# Patient Record
Sex: MALE | Race: WHITE | Employment: OTHER | URBAN - METROPOLITAN AREA
[De-identification: names, ages, dates, MRNs, and addresses within clinical notes are randomized per-mention and may not be internally consistent; named-entity substitution may affect disease eponyms.]

---

## 2017-05-27 ENCOUNTER — APPOINTMENT (OUTPATIENT)
Dept: CT IMAGING | Facility: HOSPITAL | Age: 52
End: 2017-05-27
Attending: EMERGENCY MEDICINE
Payer: COMMERCIAL

## 2017-05-27 ENCOUNTER — HOSPITAL ENCOUNTER (EMERGENCY)
Facility: HOSPITAL | Age: 52
Discharge: HOME | End: 2017-05-27
Attending: EMERGENCY MEDICINE
Payer: COMMERCIAL

## 2017-05-27 VITALS
RESPIRATION RATE: 18 BRPM | SYSTOLIC BLOOD PRESSURE: 142 MMHG | WEIGHT: 250 LBS | HEART RATE: 74 BPM | TEMPERATURE: 98 F | DIASTOLIC BLOOD PRESSURE: 75 MMHG | HEIGHT: 69 IN | BODY MASS INDEX: 37.03 KG/M2 | OXYGEN SATURATION: 95 %

## 2017-05-27 DIAGNOSIS — R10.9 ABDOMINAL PAIN OF UNKNOWN ETIOLOGY: Primary | ICD-10-CM

## 2017-05-27 PROCEDURE — 74177 CT ABD & PELVIS W/CONTRAST: CPT | Performed by: EMERGENCY MEDICINE

## 2017-05-27 PROCEDURE — 99284 EMERGENCY DEPT VISIT MOD MDM: CPT

## 2017-05-27 PROCEDURE — 80048 BASIC METABOLIC PNL TOTAL CA: CPT

## 2017-05-27 PROCEDURE — 96372 THER/PROPH/DIAG INJ SC/IM: CPT

## 2017-05-27 PROCEDURE — 85025 COMPLETE CBC W/AUTO DIFF WBC: CPT

## 2017-05-27 PROCEDURE — 83690 ASSAY OF LIPASE: CPT | Performed by: EMERGENCY MEDICINE

## 2017-05-27 PROCEDURE — 81003 URINALYSIS AUTO W/O SCOPE: CPT

## 2017-05-27 PROCEDURE — 96374 THER/PROPH/DIAG INJ IV PUSH: CPT

## 2017-05-27 PROCEDURE — 80076 HEPATIC FUNCTION PANEL: CPT | Performed by: EMERGENCY MEDICINE

## 2017-05-27 PROCEDURE — 96361 HYDRATE IV INFUSION ADD-ON: CPT

## 2017-05-27 RX ORDER — DICYCLOMINE HCL 20 MG
20 TABLET ORAL 4 TIMES DAILY PRN
Qty: 30 TABLET | Refills: 0 | Status: SHIPPED | OUTPATIENT
Start: 2017-05-27 | End: 2017-06-26

## 2017-05-27 RX ORDER — DICYCLOMINE HYDROCHLORIDE 10 MG/ML
20 INJECTION INTRAMUSCULAR ONCE
Status: COMPLETED | OUTPATIENT
Start: 2017-05-27 | End: 2017-05-27

## 2017-05-27 RX ORDER — MORPHINE SULFATE 4 MG/ML
4 INJECTION, SOLUTION INTRAMUSCULAR; INTRAVENOUS ONCE
Status: COMPLETED | OUTPATIENT
Start: 2017-05-27 | End: 2017-05-27

## 2017-05-27 NOTE — ED PROVIDER NOTES
Patient Seen in: [facility] AND [facility] Emergency Department    History   Patient presents with:  Abdomen/Flank Pain (GI/)    Stated Complaint: abdominal pain    HPI    Pt is 45 yo M who p/w 6 hours of progressively worsening constant abdominal pain.  Pt sta no cyanosis/clubbing/edema  Neuro: CN intact, normal speech, normal gait, 5/5 motor strength in all extremities, no focal deficits  SKIN: warm, dry, no rashes        ED Course     Labs Reviewed   BASIC METABOLIC PANEL (8) - Abnormal; Notable for the follow [id number]  [address] [facility]  [phone]            Medications Prescribed:  Current Discharge Medication List    START taking these medications    Dicyclomine HCl 20 MG Oral Tab  Take 1 tabl

## 2021-10-30 ENCOUNTER — HOSPITAL ENCOUNTER (INPATIENT)
Facility: HOSPITAL | Age: 56
LOS: 4 days | Discharge: HOME | DRG: 331 | End: 2021-11-04
Attending: EMERGENCY MEDICINE | Admitting: HOSPITALIST
Payer: COMMERCIAL

## 2021-10-30 ENCOUNTER — ANESTHESIA (OUTPATIENT)
Dept: SURGERY | Facility: HOSPITAL | Age: 56
DRG: 331 | End: 2021-10-30
Payer: COMMERCIAL

## 2021-10-30 ENCOUNTER — ANESTHESIA EVENT (OUTPATIENT)
Dept: SURGERY | Facility: HOSPITAL | Age: 56
DRG: 331 | End: 2021-10-30
Payer: COMMERCIAL

## 2021-10-30 ENCOUNTER — APPOINTMENT (OUTPATIENT)
Dept: CT IMAGING | Facility: HOSPITAL | Age: 56
DRG: 331 | End: 2021-10-30
Attending: EMERGENCY MEDICINE
Payer: COMMERCIAL

## 2021-10-30 DIAGNOSIS — K35.32 PERFORATED APPENDIX: ICD-10-CM

## 2021-10-30 DIAGNOSIS — K35.33 ACUTE APPENDICITIS WITH PERFORATION, LOCALIZED PERITONITIS, AND ABSCESS, WITHOUT GANGRENE: Primary | ICD-10-CM

## 2021-10-30 PROCEDURE — 0DTH4ZZ RESECTION OF CECUM, PERCUTANEOUS ENDOSCOPIC APPROACH: ICD-10-PCS | Performed by: SURGERY

## 2021-10-30 PROCEDURE — 99222 1ST HOSP IP/OBS MODERATE 55: CPT | Performed by: HOSPITALIST

## 2021-10-30 PROCEDURE — 74177 CT ABD & PELVIS W/CONTRAST: CPT | Performed by: EMERGENCY MEDICINE

## 2021-10-30 PROCEDURE — 0DTJ4ZZ RESECTION OF APPENDIX, PERCUTANEOUS ENDOSCOPIC APPROACH: ICD-10-PCS | Performed by: SURGERY

## 2021-10-30 PROCEDURE — 0W9H4ZZ DRAINAGE OF RETROPERITONEUM, PERCUTANEOUS ENDOSCOPIC APPROACH: ICD-10-PCS | Performed by: SURGERY

## 2021-10-30 RX ORDER — MORPHINE SULFATE 4 MG/ML
4 INJECTION, SOLUTION INTRAMUSCULAR; INTRAVENOUS EVERY 10 MIN PRN
Status: DISCONTINUED | OUTPATIENT
Start: 2021-10-30 | End: 2021-10-31 | Stop reason: HOSPADM

## 2021-10-30 RX ORDER — GLYCOPYRROLATE 0.2 MG/ML
INJECTION, SOLUTION INTRAMUSCULAR; INTRAVENOUS AS NEEDED
Status: DISCONTINUED | OUTPATIENT
Start: 2021-10-30 | End: 2021-10-30 | Stop reason: SURG

## 2021-10-30 RX ORDER — NEOSTIGMINE METHYLSULFATE 1 MG/ML
INJECTION INTRAVENOUS AS NEEDED
Status: DISCONTINUED | OUTPATIENT
Start: 2021-10-30 | End: 2021-10-30 | Stop reason: SURG

## 2021-10-30 RX ORDER — DEXAMETHASONE SODIUM PHOSPHATE 4 MG/ML
VIAL (ML) INJECTION AS NEEDED
Status: DISCONTINUED | OUTPATIENT
Start: 2021-10-30 | End: 2021-10-30 | Stop reason: SURG

## 2021-10-30 RX ORDER — HYDROMORPHONE HYDROCHLORIDE 1 MG/ML
0.2 INJECTION, SOLUTION INTRAMUSCULAR; INTRAVENOUS; SUBCUTANEOUS EVERY 5 MIN PRN
Status: DISCONTINUED | OUTPATIENT
Start: 2021-10-30 | End: 2021-10-31 | Stop reason: HOSPADM

## 2021-10-30 RX ORDER — BUPIVACAINE HYDROCHLORIDE AND EPINEPHRINE 2.5; 5 MG/ML; UG/ML
INJECTION, SOLUTION INFILTRATION; PERINEURAL AS NEEDED
Status: DISCONTINUED | OUTPATIENT
Start: 2021-10-30 | End: 2021-10-30 | Stop reason: HOSPADM

## 2021-10-30 RX ORDER — LIDOCAINE HYDROCHLORIDE 10 MG/ML
INJECTION, SOLUTION EPIDURAL; INFILTRATION; INTRACAUDAL; PERINEURAL AS NEEDED
Status: DISCONTINUED | OUTPATIENT
Start: 2021-10-30 | End: 2021-10-30 | Stop reason: SURG

## 2021-10-30 RX ORDER — ROCURONIUM BROMIDE 10 MG/ML
INJECTION, SOLUTION INTRAVENOUS AS NEEDED
Status: DISCONTINUED | OUTPATIENT
Start: 2021-10-30 | End: 2021-10-30 | Stop reason: SURG

## 2021-10-30 RX ORDER — ONDANSETRON 2 MG/ML
4 INJECTION INTRAMUSCULAR; INTRAVENOUS ONCE AS NEEDED
Status: COMPLETED | OUTPATIENT
Start: 2021-10-30 | End: 2021-10-30

## 2021-10-30 RX ORDER — SODIUM CHLORIDE, SODIUM LACTATE, POTASSIUM CHLORIDE, CALCIUM CHLORIDE 600; 310; 30; 20 MG/100ML; MG/100ML; MG/100ML; MG/100ML
INJECTION, SOLUTION INTRAVENOUS CONTINUOUS
Status: DISCONTINUED | OUTPATIENT
Start: 2021-10-30 | End: 2021-10-31 | Stop reason: HOSPADM

## 2021-10-30 RX ORDER — PROCHLORPERAZINE EDISYLATE 5 MG/ML
5 INJECTION INTRAMUSCULAR; INTRAVENOUS ONCE AS NEEDED
Status: ACTIVE | OUTPATIENT
Start: 2021-10-30 | End: 2021-10-30

## 2021-10-30 RX ORDER — MORPHINE SULFATE 10 MG/ML
6 INJECTION, SOLUTION INTRAMUSCULAR; INTRAVENOUS EVERY 10 MIN PRN
Status: DISCONTINUED | OUTPATIENT
Start: 2021-10-30 | End: 2021-10-31 | Stop reason: HOSPADM

## 2021-10-30 RX ORDER — MIDAZOLAM HYDROCHLORIDE 1 MG/ML
INJECTION INTRAMUSCULAR; INTRAVENOUS AS NEEDED
Status: DISCONTINUED | OUTPATIENT
Start: 2021-10-30 | End: 2021-10-30 | Stop reason: SURG

## 2021-10-30 RX ORDER — ONDANSETRON 2 MG/ML
INJECTION INTRAMUSCULAR; INTRAVENOUS AS NEEDED
Status: DISCONTINUED | OUTPATIENT
Start: 2021-10-30 | End: 2021-10-30 | Stop reason: SURG

## 2021-10-30 RX ORDER — SODIUM CHLORIDE 9 MG/ML
INJECTION, SOLUTION INTRAVENOUS CONTINUOUS PRN
Status: DISCONTINUED | OUTPATIENT
Start: 2021-10-30 | End: 2021-10-30 | Stop reason: SURG

## 2021-10-30 RX ORDER — HYDROCODONE BITARTRATE AND ACETAMINOPHEN 5; 325 MG/1; MG/1
2 TABLET ORAL AS NEEDED
Status: DISCONTINUED | OUTPATIENT
Start: 2021-10-30 | End: 2021-10-31 | Stop reason: HOSPADM

## 2021-10-30 RX ORDER — HYDROMORPHONE HYDROCHLORIDE 1 MG/ML
0.4 INJECTION, SOLUTION INTRAMUSCULAR; INTRAVENOUS; SUBCUTANEOUS EVERY 5 MIN PRN
Status: DISCONTINUED | OUTPATIENT
Start: 2021-10-30 | End: 2021-10-31 | Stop reason: HOSPADM

## 2021-10-30 RX ORDER — NALOXONE HYDROCHLORIDE 0.4 MG/ML
80 INJECTION, SOLUTION INTRAMUSCULAR; INTRAVENOUS; SUBCUTANEOUS AS NEEDED
Status: DISCONTINUED | OUTPATIENT
Start: 2021-10-30 | End: 2021-10-31 | Stop reason: HOSPADM

## 2021-10-30 RX ORDER — HYDROCODONE BITARTRATE AND ACETAMINOPHEN 5; 325 MG/1; MG/1
1 TABLET ORAL AS NEEDED
Status: DISCONTINUED | OUTPATIENT
Start: 2021-10-30 | End: 2021-10-31 | Stop reason: HOSPADM

## 2021-10-30 RX ORDER — MORPHINE SULFATE 2 MG/ML
2 INJECTION, SOLUTION INTRAMUSCULAR; INTRAVENOUS EVERY 10 MIN PRN
Status: DISCONTINUED | OUTPATIENT
Start: 2021-10-30 | End: 2021-10-31 | Stop reason: HOSPADM

## 2021-10-30 RX ORDER — HALOPERIDOL 5 MG/ML
0.25 INJECTION INTRAMUSCULAR ONCE AS NEEDED
Status: ACTIVE | OUTPATIENT
Start: 2021-10-30 | End: 2021-10-30

## 2021-10-30 RX ORDER — HYDROMORPHONE HYDROCHLORIDE 1 MG/ML
0.6 INJECTION, SOLUTION INTRAMUSCULAR; INTRAVENOUS; SUBCUTANEOUS EVERY 5 MIN PRN
Status: DISCONTINUED | OUTPATIENT
Start: 2021-10-30 | End: 2021-10-31 | Stop reason: HOSPADM

## 2021-10-30 RX ADMIN — SODIUM CHLORIDE: 9 INJECTION, SOLUTION INTRAVENOUS at 22:31:00

## 2021-10-30 RX ADMIN — MIDAZOLAM HYDROCHLORIDE 2 MG: 1 INJECTION INTRAMUSCULAR; INTRAVENOUS at 20:07:00

## 2021-10-30 RX ADMIN — LIDOCAINE HYDROCHLORIDE 50 MG: 10 INJECTION, SOLUTION EPIDURAL; INFILTRATION; INTRACAUDAL; PERINEURAL at 20:07:00

## 2021-10-30 RX ADMIN — GLYCOPYRROLATE 0.4 MG: 0.2 INJECTION, SOLUTION INTRAMUSCULAR; INTRAVENOUS at 22:23:00

## 2021-10-30 RX ADMIN — DEXAMETHASONE SODIUM PHOSPHATE 4 MG: 4 MG/ML VIAL (ML) INJECTION at 22:02:00

## 2021-10-30 RX ADMIN — ROCURONIUM BROMIDE 30 MG: 10 INJECTION, SOLUTION INTRAVENOUS at 20:16:00

## 2021-10-30 RX ADMIN — ONDANSETRON 4 MG: 2 INJECTION INTRAMUSCULAR; INTRAVENOUS at 22:12:00

## 2021-10-30 RX ADMIN — NEOSTIGMINE METHYLSULFATE 3 MG: 1 INJECTION INTRAVENOUS at 22:23:00

## 2021-10-30 RX ADMIN — SODIUM CHLORIDE: 9 INJECTION, SOLUTION INTRAVENOUS at 20:03:00

## 2021-10-30 NOTE — PROGRESS NOTES
[facility] Pharmacy Note: Antimicrobial Weight Based Dose Adjustment for: piperacillin/tazobactam (Nell Lent)    Usha Cheney is a 60year old patient who has been prescribed piperacillin/tazobactam (ZOSYN) 3.375 gm x 1 dose.     Estimated Creatinine Clearance: 75.8 mL/

## 2021-10-30 NOTE — ANESTHESIA PREPROCEDURE EVALUATION
Anesthesia PreOp Note    HPI:     Kareem Luna is a 60year old male who presents for preoperative consultation requested by: Janice Savage MD    Date of Surgery: 10/30/2021    Procedure(s):  LAPAROSCOPIC APPENDECTOMY, POSSIBLE OPEN  Indication: Perfor Exercise per Session: Not on file  Stress:       Feeling of Stress : Not on file  Social Connections:       Frequency of Communication with Friends and Family: Not on file      Frequency of Social Gatherings with Friends and Family: Not on file      Attend Neuro/Psych      GI/Hepatic/Renal      Endo/Other    Abdominal   (+) obese,                Anesthesia Plan:   ASA:  2  Emergent    Plan:   General  Airway:  ETT  Informed Consent Plan and Risks Discussed With:  Patient      I have informed Tracee triplett

## 2021-10-30 NOTE — ED INITIAL ASSESSMENT (HPI)
R flank pain onset Tuesday -thur and returned last night. +loss appetite, belching, nausea and diarrhea

## 2021-10-31 PROCEDURE — 99233 SBSQ HOSP IP/OBS HIGH 50: CPT | Performed by: HOSPITALIST

## 2021-10-31 RX ORDER — ACETAMINOPHEN 10 MG/ML
1000 INJECTION, SOLUTION INTRAVENOUS EVERY 6 HOURS
Status: DISCONTINUED | OUTPATIENT
Start: 2021-10-31 | End: 2021-11-04

## 2021-10-31 RX ORDER — ONDANSETRON 2 MG/ML
4 INJECTION INTRAMUSCULAR; INTRAVENOUS EVERY 6 HOURS PRN
Status: DISCONTINUED | OUTPATIENT
Start: 2021-10-31 | End: 2021-11-04

## 2021-10-31 RX ORDER — HEPARIN SODIUM 5000 [USP'U]/ML
5000 INJECTION, SOLUTION INTRAVENOUS; SUBCUTANEOUS EVERY 12 HOURS
Status: DISCONTINUED | OUTPATIENT
Start: 2021-10-31 | End: 2021-11-04

## 2021-10-31 RX ORDER — DEXTROSE, SODIUM CHLORIDE, AND POTASSIUM CHLORIDE 5; .45; .15 G/100ML; G/100ML; G/100ML
INJECTION INTRAVENOUS CONTINUOUS
Status: DISCONTINUED | OUTPATIENT
Start: 2021-10-31 | End: 2021-11-04

## 2021-10-31 RX ORDER — METOCLOPRAMIDE HYDROCHLORIDE 5 MG/ML
10 INJECTION INTRAMUSCULAR; INTRAVENOUS EVERY 6 HOURS PRN
Status: DISCONTINUED | OUTPATIENT
Start: 2021-10-31 | End: 2021-11-04

## 2021-10-31 RX ORDER — FAMOTIDINE 10 MG/ML
20 INJECTION, SOLUTION INTRAVENOUS 2 TIMES DAILY
Status: DISCONTINUED | OUTPATIENT
Start: 2021-10-31 | End: 2021-11-04

## 2021-10-31 NOTE — PROGRESS NOTES
[facility]    Progress Note    Rudy Parra Patient Status:  Inpatient    1965 MRN [id number]   Location [facility] 4W/SW/SE Attending Shana Ruiz MD   Hosp Day # 0 PCP Gerda Garcia MD       Subjective:     Feels ok  10/31/2021    CO2 25.0 10/31/2021     (H) 10/31/2021    CA 7.8 (L) 10/31/2021    ALB 2.6 (L) 10/31/2021    ALKPHO 79 10/31/2021    BILT 0.6 10/31/2021    TP 7.1 10/31/2021    AST 14 (L) 10/31/2021    ALT 41 10/31/2021    LIP 90 10/30/2021

## 2021-10-31 NOTE — BRIEF OP NOTE
Pre-Operative Diagnosis: Perforated appendix WITH ABSCESS   50 appendix WITH ABSCESS      Procedure Performed:   LAPAROSCOPIC APPENDECTOMY WITH CECECTOMY, DRAINAGE OF ABSCESS    Surgeon(s) and Role:     Lashanda Tapia MD - Primary    Assistant(s):

## 2021-10-31 NOTE — CONSULTS
[facility]    Report of Consultation    Celia Birmingham Patient Status:  Emergency    1965 MRN [id number]   Location [facility] PRE OP RECOVERY Attending Joanna Rodriguez MD   Hosp Day # 0 PCP Sean Upton MD     Date of A PRN  •  HYDROmorphone HCl (DILAUDID) 1 MG/ML injection 0.2 mg, 0.2 mg, Intravenous, Q5 Min PRN  •  HYDROmorphone HCl (DILAUDID) 1 MG/ML injection 0.4 mg, 0.4 mg, Intravenous, Q5 Min PRN  •  HYDROmorphone HCl (DILAUDID) 1 MG/ML injection 0.6 mg, 0.6 mg, Int 10/30/2021    CREATSERUM 1.11 10/30/2021    BUN 13 10/30/2021     10/30/2021    K 3.8 10/30/2021     10/30/2021    CO2 27.0 10/30/2021    GLU 91 10/30/2021    CA 9.1 10/30/2021    ALB 3.1 (L) 10/30/2021    ALKPHO 87 10/30/2021    BILT 0.5 10/30 infarction, respiratory failure, renal failure, pulmonary and was, DVT, and even death were all discussed in detail with the patient and his wife who  understands and consents and wishes to proceed with the operation.  We will plan laparoscopic appendectomy

## 2021-10-31 NOTE — ANESTHESIA POSTPROCEDURE EVALUATION
Patient: Lisette Flores    Procedure Summary     Date: 10/30/21 Room / Location: [address] [place] [address] MAIN OR 04 / [address] [place] [address] MAIN OR    Anesthesia Start: 2003 Anesthesia Stop:     Procedure: LAPAROSCOPIC APPENDECTOMY WITH CECECTOMY, DRAINAGE OF ABSCESS (N/A Abdomen) Diagnosis:

## 2021-10-31 NOTE — PROGRESS NOTES
ADMISSION NOTE    60year old male with no significanat PMH presented with RLQ abdominal pain found to have perforated appendix with phlegmon. Went from ED to OR for appendectomy. Available medical records partially reviewed. Dictation to follow.     Ebony Alba

## 2021-10-31 NOTE — ANESTHESIA PROCEDURE NOTES
Airway  Date/Time: 10/30/2021 8:09 PM  Urgency: Elective      General Information and Staff    Patient location during procedure: OR  Anesthesiologist: Magy Hampton MD  Performed: anesthesiologist     Indications and Patient Condition  Indications f

## 2021-10-31 NOTE — OPERATIVE REPORT
L[facility] 43 OPERATIVE REPORT:     PATIENT NAME: Michelle Cowden  : 1965   MRN: [id number]  SITE:  [facility]     DATE OF OPERATION:   10/30/2021     PREOPERATIVE DIAGNOSIS:  Acute appendicitis with abscess         POSTOPERAT and even death were all discussed in detail with the patient and his wife who  understands and consents and wishes to proceed with the operation.  We will plan laparoscopic appendectomy possible open appendectomy, at [facility] emergently this evenin inflammatory process in the cecum. I am uncertain if this is a perforated cecal cancer or just perforated appendicitis. Given the above I felt it was appropriate to perform a cecectomy.   Using a powered Chicora 60 stapler the cecum mesoappendix and the a extubated in stable condition. All counts were correct at the end of the case. The role of my assistant was critical to the operation. They acted in manipulation of the camera, retraction of the appendix as well as closure of the abdominal wounds.

## 2021-10-31 NOTE — PROGRESS NOTES
PHYSICAL THERAPY EVALUATION - INPATIENT     Room Number: 420/420-A  Evaluation Date: 10/31/2021  Type of Evaluation: Initial   Physician Order: PT Eval and Treat    Presenting Problem: Pt admitted w/ acute perforated appendicitis with abscess s/p lap appe for continued PT at home after discharge. Anticipate Pt to return home w/ initial intermittent assistance when medically appropriate. Will cont to follow during IP stay to increase activity and independence, goals established.    DISCHARGE RECOMMENDATIO (\" Minimum\")  Location: incisional  Management Techniques: Activity promotion; Body mechanics;Breathing techniques;Relaxation;Repositioning (bracing incisions for effective cough)    COGNITION  · AXO x4    RANGE OF MOTION AND STRENGTH ASSESSMENT  BLE rachana training  Posture  Strengthening  Lower therapeutic exercise:   Ankle pumps  Heel raises  Heel slides  LAQ    Patient End of Session: Up in chair;Needs met;Call light within reach;RN aware of session/findings    CURRENT GOALS    Goals to be met by: 47.27.99

## 2021-10-31 NOTE — ED PROVIDER NOTES
Patient Seen in: [id number] [facility] [address]      History   Patient presents with:  Abdomen/Flank Pain    Stated Complaint: abd pain     Subjective:   HPI    64-year-old male with no significant past medical history here with complaints o 10/30/21 2232 Nasal cannula       Current:/56   Pulse 63   Temp 97.6 °F (36.4 °C) (Temporal)   Resp 21   Ht 177.8 cm (5' 10\")   Wt 115.7 kg   SpO2 98%   BMI 36.59 kg/m²         Physical Exam  Vitals and nursing note reviewed.    HENT:      Head: Norm 73 >=60    GFR, -American 85 >=60    ALT 48 16 - 61 U/L    AST 15 15 - 37 U/L    Alkaline Phosphatase 87 45 - 117 U/L    Bilirubin, Total 0.5 0.1 - 2.0 mg/dL    Total Protein 7.8 6.4 - 8.2 g/dL    Albumin 3.1 (L) 3.4 - 5.0 g/dL    Globulin  4.7 (H) 7:05 PM    Specimen: Nares;  Other   Result Value Ref Range    Rapid SARS-CoV-2 by PCR Not Detected Not Detected       Imaging Results Available and Reviewed by me while in ED:  CT ABDOMEN PELVIS IV CONTRAST, NO ORAL (ER)    Result Date: 10/30/2021  CONCLUS on the presenting problem including appendicitis, UTI, diverticulitis, nephrolithiasis.     Critical Care Time:  Total critical care time for this patient was 30 minutes exclusive of separately billable procedures, but in obtaining history, performing a phy

## 2021-10-31 NOTE — H&P
[facility]    PATIENT'S NAME: Carleen Parker   ATTENDING PHYSICIAN: Domi Chamberlain MD   PATIENT ACCOUNT#:   [de-identified]    LOCATION:  [address] [place] [address] #:   [id number]       YOB: 1965  ADMISSION DATE:       10/30/20 syndrome, the etiology of which is unclear. He otherwise has had no other surgeries, and denies any chronic medical problems such as diabetes, high blood pressure, asthma, or heart problems.   He does snore at night and has a body habitus suggestive of sle His mood and affect were pleasant and cooperative. BACK:  There was no costovertebral angle tenderness noted. LABORATORY DATA:  Previously mentioned. ASSESSMENT AND PLAN:    1.    Acute perforated appendicitis status post laparoscopic appendectomy wi

## 2021-10-31 NOTE — PLAN OF CARE
Pt is ao x 4, pt is sleeping on 3L NC, pt using morphine PCA for pain, pt has two ANNA drains, call light next to pt, pt's wife stayed the night, pt has NG tube set to L. I.S., pt is voiding via mcallister which stays in til Monday    Problem: PAIN - ADULT  Goal: resources  Description: INTERVENTIONS:  - Identify barriers to discharge w/pt and caregiver  - Include patient/family/discharge partner in discharge planning  - Arrange for needed discharge resources and transportation as appropriate  - Identify discharge

## 2021-11-01 PROCEDURE — 99233 SBSQ HOSP IP/OBS HIGH 50: CPT | Performed by: HOSPITALIST

## 2021-11-01 NOTE — PAYOR COMM NOTE
--------------  ADMISSION REVIEW     Payor: MILLY LINDA  Subscriber #:  [id number]  Authorization Number: [id number]    Admit date: 10/31/21  Admit time: 12:03 AM     10/30 Patient Seen in: [facility] ED    History   Stated Complaint: abd pain     57 39.8    RDW 11.7    .0    Neutrophil Absolute Prelim 13.21 (H)    Neutrophil Absolute 13.21 (H)    Lymphocyte Absolute 1.46    Monocyte Absolute 1.20 (H)    Eosinophil Absolute 0.23    Basophil Absolute 0.05    Immature Granulocyte Absolute 0.15 pain.    HISTORY OF PRESENT ILLNESS:  This is a very pleasant 43-year-old gentleman who first developed some abdominal discomfort about a week ago. Pain has been primarily confined to the right lower quadrant.   The patient thought it was getting better a pressure 125/66, O2 saturation 97% on 3L nasal cannula. HEENT:  Normocephalic, atraumatic. Pupils equal, reactive. Sclerae anicteric. There was no sinus tenderness. Mucous membranes were moist.  Oropharynx was crowded. NECK:  Supple.   LUNGS:  Decreas temperature 97.9 °F (36.6 °C), temperature source Oral, resp. rate 16, height 5' 10\" (1.778 m), weight 255 lb (115.7 kg), SpO2 96 %.     GEN: NAD, up in chair, NGT+  HEENT: conjunctivae/corneas clear. PERRL, EOM's intact.   Neck: no adenopathy, no carotid output:1250; Drains:185; Blood:50]      General appearance: alert, appears stated age and cooperative  Neck: no JVD and supple, symmetrical, trachea midline  Pulmonary: No labored breathing, equal respiratory excursion  Cardiovascular: regular rate and rhy NS 30 mL PCA syringe     Date Action Dose Route User    11/1/2021 0747 New Bag 30 mg Intravenous [place] Howard RN    10/31/2021 1543 New Bag 30 mg Intravenous Kimberly Butcher RN      Piperacillin Sod-Tazobactam So (ZOSYN) 4.5 g in dextrose 5% 100 mL IVP

## 2021-11-01 NOTE — PLAN OF CARE
Patient alert and oriented x4. Alvarez in place. Pain controlled with PCA pump. Patient on room air. NG tube in place on intermittent suction. Patient ambulating with standby assist. Vital signs stable. CMS intact.  Three abdominal dressings clean, dry and in relaxation techniques  - Monitor for opioid side effects  - Notify MD/LIP if interventions unsuccessful or patient reports new pain  - Anticipate increased pain with activity and pre-medicate as appropriate  Outcome: Progressing     Problem: RISK FOR INFEC cognitive ability or social support system  Outcome: Progressing

## 2021-11-01 NOTE — PROGRESS NOTES
[facility]    Progress Note  Late entry    Keiry Annamaria Patient Status:  Inpatient    1965 MRN [id number]   Location [facility] 4W/SW/SE Attending Tara Grey MD   Hosp Day # 1 PCP Wally Gonzales MD       Subjective: 8.3*   ALB 3.1* 2.6*  --     136 138   K 3.8 4.5 4.0    105 106   CO2 27.0 25.0 26.0   ALKPHO 87 79  --    AST 15 14*  --    ALT 48 41  --    BILT 0.5 0.6  --    TP 7.8 7.1  --              CT ABDOMEN PELVIS IV CONTRAST, NO ORAL (ER)    Result

## 2021-11-01 NOTE — PROGRESS NOTES
[facility]    Progress Note    Lisette Flores Patient Status:  Inpatient    1965 MRN [id number]   Location [facility] 4W/SW/SE Attending Justice Rubinstein, MD   Hosp Day # 1 PCP Su Figueroa MD       Subjective:     Feels ok 11/01/2021     11/01/2021    CO2 26.0 11/01/2021     (H) 11/01/2021    CA 8.3 (L) 11/01/2021    ALB 2.6 (L) 10/31/2021    ALKPHO 79 10/31/2021    BILT 0.6 10/31/2021    TP 7.1 10/31/2021    AST 14 (L) 10/31/2021    ALT 41 10/31/2021    LIP 90

## 2021-11-01 NOTE — PLAN OF CARE
Pt is ao x 4, pt is sleeping on room air, pt using morphine PCA for pain, pt has two ANNA drains, call light next to pt, pt's wife stayed the night, pt has NG tube set to L. I.S., pt is voiding via mcallister which stays in til Monday     Problem: PAIN - ADULT  Go appropriate resources  Description: INTERVENTIONS:  - Identify barriers to discharge w/pt and caregiver  - Include patient/family/discharge partner in discharge planning  - Arrange for needed discharge resources and transportation as appropriate  - Identif

## 2021-11-01 NOTE — PROGRESS NOTES
[facility]    Progress Note  Late entry    Chandrika Bagley Patient Status:  Inpatient    1965 MRN [id number]   Location [facility] 4W/SW/SE Attending Casey Quispe MD   Hosp Day # 1 PCP Dio Ram MD       Subjective: CREATSERUM 1.11 1.12 0.97   GFRAA 85 84 100   GFRNAA 73 73 86   CA 9.1 7.8* 8.3*   ALB 3.1* 2.6*  --     136 138   K 3.8 4.5 4.0    105 106   CO2 27.0 25.0 26.0   ALKPHO 87 79  --    AST 15 14*  --    ALT 48 41  --    BILT 0.5 0.6  --    TP 7

## 2021-11-02 PROCEDURE — 99232 SBSQ HOSP IP/OBS MODERATE 35: CPT | Performed by: HOSPITALIST

## 2021-11-02 NOTE — PROGRESS NOTES
[facility]    Progress Note  Late entry    Francisco Nogueira Patient Status:  Inpatient    1965 MRN [id number]   Location [facility] 4W/SW/SE Attending Aravind Webb MD   Hosp Day # 2 PCP Shimon Freed MD       Subjective: 10/31/21  0726 11/01/21  0654 11/02/21  0648   GLU 91   < > 176* 135* 110*   BUN 13   < > 11 9 6*   CREATSERUM 1.11   < > 1.12 0.97 0.98   GFRAA 85   < > 84 100 99   GFRNAA 73   < > 73 86 85   CA 9.1   < > 7.8* 8.3* 8.8   ALB 3.1*  --  2.6*  --   --    NA

## 2021-11-02 NOTE — PROGRESS NOTES
[facility]    Progress Note    Bob Leija Patient Status:  Inpatient    1965 MRN [id number]   Location [facility] 4W/SW/SE Attending Shavonne Shelby MD   Hosp Day # 2 PCP Osmar Zavala MD       Subjective:     Feels ok 10/31/2021    AST 14 (L) 10/31/2021    ALT 41 10/31/2021    LIP 90 10/30/2021    MG 2.3 11/02/2021    PHOS 2.4 (L) 11/02/2021       No results found.           Levar Cr MD  11/2/2021

## 2021-11-02 NOTE — PHYSICAL THERAPY NOTE
PHYSICAL THERAPY TREATMENT NOTE - INPATIENT     Room Number: 420/420-A       Presenting Problem: Pt admitted w/ acute perforated appendicitis with abscess s/p lap appendectomy with cecectomy and drainage of large retroperitoneal abscess     Problem List  P mechanics; Endurance; Energy conservation;Patient education;Gait training;Strengthening;Stair training;Transfer training;Balance training    SUBJECTIVE  \"I feel much better than yesterday! \"    OBJECTIVE  Precautions: Drain(s) (NG tube and mcallister cath)    WE home   Goal #1 Patient is able to demonstrate supine - sit EOB @ level: independent/ flat bed      Goal #1   Current Status  Reviewed technique- pt expressed understanding, \"that's what I did this morning\"   Goal #2 Patient is able to demonstrate transfe

## 2021-11-02 NOTE — PLAN OF CARE
Patient alert and oriented x4. Pain controlled with PCA pump and IV acetaminophen. Patient up to bathroom with standby assist, voiding freely. NG tube set to low suction, right nare. Flushed per order.  Patient maintains intake restrictions of ice chips and opioid side effects  - Notify MD/LIP if interventions unsuccessful or patient reports new pain  - Anticipate increased pain with activity and pre-medicate as appropriate  Outcome: Progressing     Problem: RISK FOR INFECTION - ADULT  Goal: Absence of fever/ system  Outcome: Progressing

## 2021-11-02 NOTE — PLAN OF CARE
Monitoring vital signs- stable at this time. No acute changes noted throughout shift. Receiving IV fluids and IV antibiotics per MD order. Morphine PCA pump in place along with scheduled IV tylenol for pain.  Per patient, pain is well controlled and did not pain medications  - Follow MD orders  - Medications as ordered  - Follow diet order  - GI consult  - Monitor output of NG  - See additional Care Plan goals for specific interventions  Outcome: Progressing     Problem: PAIN - ADULT  Goal: Verbalizes/display resources  Description: INTERVENTIONS:  - Identify barriers to discharge w/pt and caregiver  - Include patient/family/discharge partner in discharge planning  - Arrange for needed discharge resources and transportation as appropriate  - Identify discharge

## 2021-11-02 NOTE — PAYOR COMM NOTE
--------------  11/2 CONTINUED STAY REVIEW    Payor: MILLY LINDA  Subscriber #:  [id number]  Authorization Number: [id number]      HOSPITALIST:  Feels okay, pains under control, on PCA  Breathing okay, no chest pain  No vomiting        Objective:   Blood pr Intravenous Sandeep Barriga RN      Heparin Sodium (Porcine) 5000 UNIT/ML injection 5,000 Units     Date Action Dose Route User    11/2/2021 1122 Given 5,000 Units Subcutaneous (Left Upper Arm) Jeremías Sampson RN    11/1/2021 2222 Given 5,000 Units Wong

## 2021-11-03 PROCEDURE — 99233 SBSQ HOSP IP/OBS HIGH 50: CPT | Performed by: HOSPITALIST

## 2021-11-03 RX ORDER — MORPHINE SULFATE 4 MG/ML
6 INJECTION, SOLUTION INTRAMUSCULAR; INTRAVENOUS EVERY 2 HOUR PRN
Status: DISCONTINUED | OUTPATIENT
Start: 2021-11-03 | End: 2021-11-04

## 2021-11-03 RX ORDER — MORPHINE SULFATE 4 MG/ML
4 INJECTION, SOLUTION INTRAMUSCULAR; INTRAVENOUS EVERY 2 HOUR PRN
Status: DISCONTINUED | OUTPATIENT
Start: 2021-11-03 | End: 2021-11-04

## 2021-11-03 RX ORDER — MORPHINE SULFATE 2 MG/ML
2 INJECTION, SOLUTION INTRAMUSCULAR; INTRAVENOUS EVERY 2 HOUR PRN
Status: DISCONTINUED | OUTPATIENT
Start: 2021-11-03 | End: 2021-11-04

## 2021-11-03 NOTE — PROGRESS NOTES
[facility]  Hospitalist Progress Note     Bola Fagan Patient Status:  Inpatient    1965  60year old [facility] [id number]   Location 420/420-A Attending Muna Mathur MD   Hosp Day # 3 PCP Terry Coyne MD     Assessment & Plan:   ----- 10/30/21  1522 10/30/21  1522 10/31/21  0726 10/31/21  0726 11/01/21  0654 11/02/21  0648 11/03/21  0639   GLU 91   < > 176*   < > 135* 110* 116*   BUN 13   < > 11   < > 9 6* 6*   CREATSERUM 1.11   < > 1.12   < > 0.97 0.98 0.90   GFRAA 85   < > 84   < > 10

## 2021-11-03 NOTE — PAYOR COMM NOTE
--------------  CONTINUED STAY REVIEW    Payor: MILLY LINDA  Subscriber #:  [id number]  Authorization Number: [id number]    Admit date: 10/31/21  Admit time: 12:03 AM    Admitting Physician: Kenia Teague MD  Attending Physician:  Arcelia Nash MD (36.7 °C) 67 20 106/60 95 % — None (Room air) — DJ    11/02/21 2003 98.5 °F (36.9 °C) 67 18 120/65 98 % — None (Room air) — NH    11/02/21 1426 98.1 °F (36.7 °C) 75 18 113/68 96 % — None (Room air) — KS    11/02/21 0431 98.3 °F (36.8 °C) 66 18 119/73 94 % fluid serous  + flatus, no BM  Extremities: extremities normal, atraumatic, no cyanosis or edema              Results:               Recent Labs   Lab 11/01/21  0654 11/02/21  0648 11/03/21  0639   RBC 3.82* 4.08* 4.01*   HGB 11.7* 12.3* 12.1*   HCT 35.7*

## 2021-11-03 NOTE — PLAN OF CARE
Pt is A&Ox4. On RA. Up with one assist. Tolerating ice chips and hard candy. Passed gas tonight. NG tube to LIS and flushed per orders. ANNA drains intact and emptied. Pain managed with PCA morphine pump and IV acetaminophen.  IV zosyn and IV fluids continue pain and pain management  - Manage/alleviate anxiety  - Utilize distraction and/or relaxation techniques  - Monitor for opioid side effects  - Notify MD/LIP if interventions unsuccessful or patient reports new pain  - Anticipate increased pain with activit based on physician/LIP order or complex needs related to functional status, cognitive ability or social support system  Outcome: Progressing

## 2021-11-03 NOTE — PROGRESS NOTES
[facility]    Progress Note  Late entry    Juju Collins Patient Status:  Inpatient    1965 MRN [id number]   Location [facility] 4W/SW/SE Attending Barbi Jenkins MD   Hosp Day # 3 PCP Andrea Caceres MD       Subjective: PT, INR    Recent Labs   Lab 10/30/21  1522 10/30/21  1522 10/31/21  0726 10/31/21  0726 11/01/21  0654 11/02/21  0648 11/03/21  0639   GLU 91   < > 176*   < > 135* 110* 116*   BUN 13   < > 11   < > 9 6* 6*   CREATSERUM 1.11   < > 1.12   < > 0.97 0.98 0.90

## 2021-11-04 VITALS
WEIGHT: 255 LBS | DIASTOLIC BLOOD PRESSURE: 77 MMHG | TEMPERATURE: 98 F | HEART RATE: 65 BPM | RESPIRATION RATE: 16 BRPM | OXYGEN SATURATION: 97 % | HEIGHT: 70 IN | BODY MASS INDEX: 36.51 KG/M2 | SYSTOLIC BLOOD PRESSURE: 133 MMHG

## 2021-11-04 PROCEDURE — 99239 HOSP IP/OBS DSCHRG MGMT >30: CPT | Performed by: HOSPITALIST

## 2021-11-04 RX ORDER — TRAMADOL HYDROCHLORIDE 50 MG/1
50 TABLET ORAL EVERY 6 HOURS PRN
Qty: 8 TABLET | Refills: 0 | Status: SHIPPED | OUTPATIENT
Start: 2021-11-04 | End: 2022-01-24

## 2021-11-04 RX ORDER — AMOXICILLIN AND CLAVULANATE POTASSIUM 875; 125 MG/1; MG/1
1 TABLET, FILM COATED ORAL 2 TIMES DAILY
Qty: 14 TABLET | Refills: 0 | Status: SHIPPED | OUTPATIENT
Start: 2021-11-04 | End: 2021-11-11

## 2021-11-04 RX ORDER — TRAMADOL HYDROCHLORIDE 50 MG/1
50 TABLET ORAL EVERY 6 HOURS PRN
Status: DISCONTINUED | OUTPATIENT
Start: 2021-11-04 | End: 2021-11-04

## 2021-11-04 RX ORDER — ACETAMINOPHEN 500 MG
1000 TABLET ORAL EVERY 8 HOURS
Status: DISCONTINUED | OUTPATIENT
Start: 2021-11-04 | End: 2021-11-04

## 2021-11-04 RX ORDER — NAPROXEN 500 MG/1
500 TABLET ORAL 2 TIMES DAILY WITH MEALS
Status: DISCONTINUED | OUTPATIENT
Start: 2021-11-04 | End: 2021-11-04

## 2021-11-04 NOTE — PROGRESS NOTES
Took over care at 1600. Patient ambulates independently. Tolerates soft/low residual diet. Dressing to old ANNA drains changed. Patient will be discharged home after his dose of IV antibiotic.

## 2021-11-04 NOTE — PROGRESS NOTES
[facility]    Progress Note  Late entry    Jonathan Ha Patient Status:  Inpatient    1965 MRN [id number]   Location [facility] 4W/SW/SE Attending Es Armando MD   Hosp Day # 4 PCP Fran Piña MD       Subjective: 11/03/21  0639 11/04/21  0624   GLU 91   < > 176*   < > 110* 116* 103*   BUN 13   < > 11   < > 6* 6* 8   CREATSERUM 1.11   < > 1.12   < > 0.98 0.90 1.02   GFRAA 85   < > 84   < > 99 109 94   GFRNAA 73   < > 73   < > 85 94 81   CA 9.1   < > 7.8*   < > 8.8 8

## 2021-11-04 NOTE — PLAN OF CARE
Patient is alert and oriented. RA. SCDs on for DVT prophylaxis. Voiding freely. Up with standby assistance, patient walking in baig with wife. Scheduled tylenol given for pain management. NG tube removed per MD orders. ANNA drains in place x2.  Dressings to a Free from fall injury  Description: INTERVENTIONS:  - Assess pt frequently for physical needs  - Identify cognitive and physical deficits and behaviors that affect risk of falls.   - Wallace fall precautions as indicated by assessment.  - Educate pt/famil

## 2021-11-04 NOTE — PLAN OF CARE
Patient alert and oriented x4. Patient denies pain or needing pain medications. Patient ambulates with self. ANNA x2 removed per order, patient tolerated well. Diet advanced to full liquids. Patient tolerated soup for lunch, denied NV.  For dinner, diet advan pain management  - Manage/alleviate anxiety  - Utilize distraction and/or relaxation techniques  - Monitor for opioid side effects  - Notify MD/LIP if interventions unsuccessful or patient reports new pain  - Anticipate increased pain with activity and pre physician/LIP order or complex needs related to functional status, cognitive ability or social support system  Outcome: Progressing

## 2021-11-04 NOTE — PROGRESS NOTES
[facility]  Hospitalist Progress Note     Lexus Norton Patient Status:  Inpatient    1965  60year old [facility] [id number]   Location 420/420-A Attending Amauri Bejarano MD   Hosp Day # 4 PCP Amilcar Mckeon MD     Assessment & Plan:   ----- 10/30/21  1522 10/30/21  1522 10/31/21  0726 11/01/21  0654 11/02/21  0648 11/03/21  0639 11/04/21  0624   GLU 91   < > 176*   < > 110* 116* 103*   BUN 13   < > 11   < > 6* 6* 8   CREATSERUM 1.11   < > 1.12   < > 0.98 0.90 1.02   GFRAA 85   < > 84   < > 99

## 2021-11-04 NOTE — DISCHARGE SUMMARY
[place] HOSPITALIST  DISCHARGE SUMMARY     Kaylin Pryor Patient Status:  Inpatient    1965 MRN [id number]   Location [facility] 4W/SW/SE Attending Arcelia Nash MD   Hosp Day # 4 PCP Kurt Mcghee MD     DATE OF ADMISSION: 10/30/2021  DATE COURSE:  Patient was admitted, seen by general surgery, taken for urgent laparoscopic appendectomy, seek ectomy and drainage of abscess. Patient did well postoperatively. Diet was advanced, pain controlled.   Microbiology showed E. coli, Bacteroides, Stre prescriptions at the location directed by your doctor or nurse    Bring a paper prescription for each of these medications  · amoxicillin clavulanate 875-125 MG Tabs  · traMADol 50 MG Tabs         CONSULTANTS  Consultants     Provider Role Specialty    Mar

## 2021-11-04 NOTE — PLAN OF CARE
Patient alert and oriented x4. Patient denies needing pain medication. Patient ambulates with standby to bathroom and voids freely. NG tube in place in right nare. Per order, after 4 hour trial, NG ronan back 40 mL. Second half of trial ends at 2020 today. management  - Manage/alleviate anxiety  - Utilize distraction and/or relaxation techniques  - Monitor for opioid side effects  - Notify MD/LIP if interventions unsuccessful or patient reports new pain  - Anticipate increased pain with activity and pre-medi physician/LIP order or complex needs related to functional status, cognitive ability or social support system  Outcome: Progressing

## 2021-11-04 NOTE — SPIRITUAL CARE NOTE
Pt sitting up in chair. Pt recovering from appendectomy and removal of abscess.  introduced herself and the SCT and offered radical hospitality, which was denied by the pt as he's being DC today.

## 2021-11-04 NOTE — PAYOR COMM NOTE
--------------  11/4 CONTINUED STAY REVIEW    Vanessa Sherwood PPEDDIE  Subscriber #:  [id number]  Authorization Number: [id number]       NURSING:  Up with standby assistance, patient walking in baig with wife. Scheduled tylenol given for pain management.  NG tube Date Action Dose Route User    11/4/2021 0910 Given 20 mg Intravenous Toni Huerta RN    11/3/2021 2059 Given 20 mg Intravenous Rishi Wade, RN      Heparin Sodium (Porcine) 5000 UNIT/ML injection 5,000 Units     Date Action Dose Route User

## 2021-11-05 ENCOUNTER — PATIENT OUTREACH (OUTPATIENT)
Dept: CASE MANAGEMENT | Age: 56
End: 2021-11-05

## 2021-11-05 NOTE — PROGRESS NOTES
VM received; pts wife, Julee Lopez requesting assistance w/scheduling apt (dc 11/04)    Dr Isaac Perea  [address]  [phone]  Follow up 1 week  Apt made:  Fri 11/12 @10:00am    Confirmed w/pt wife, Julee Lopez

## 2021-11-08 NOTE — PAYOR COMM NOTE
--------------  DISCHARGE REVIEW    Payor: MILLY LINDA  Subscriber #:  [id number]  Authorization Number: [id number]    Admit date: 10/31/21  Admit time:  12:03 AM  Discharge Date: 11/4/2021  6:58 PM     Admitting Physician: Clemencia Rinaldi MD  Attendin White count was 16.3, with a hemoglobin of 13.9, and a platelet count of 031,380. There were 81% neutrophils. The glucose was 91, sodium 136, potassium 3.8, chloride 102, CO2 of 27, BUN of 13 with a creatinine 1.11, calcium 9.1, anion gap of 7.   Liver fu taking these medications      Instructions Prescription details   amoxicillin clavulanate 875-125 MG Tabs  Commonly known as: AUGMENTIN      Take 1 tablet by mouth 2 (two) times daily for 7 days.    Stop taking on: November 11, 2021  Quantity: 14 tablet  Re

## 2021-11-17 PROBLEM — Z12.11 ENCOUNTER FOR SCREENING COLONOSCOPY: Status: ACTIVE | Noted: 2021-11-17

## 2022-01-15 ENCOUNTER — LAB REQUISITION (OUTPATIENT)
Dept: SURGERY | Age: 57
End: 2022-01-15
Payer: COMMERCIAL

## 2022-01-15 DIAGNOSIS — Z01.818 PREOP EXAMINATION: ICD-10-CM

## 2022-01-17 LAB — SARS-COV-2 RNA RESP QL NAA+PROBE: NOT DETECTED

## 2022-01-18 ENCOUNTER — SURGERY CENTER DOCUMENTATION (OUTPATIENT)
Dept: SURGERY | Age: 57
End: 2022-01-18

## 2022-01-18 ENCOUNTER — LAB REQUISITION (OUTPATIENT)
Dept: SURGERY | Age: 57
End: 2022-01-18
Payer: COMMERCIAL

## 2022-01-18 DIAGNOSIS — Z12.11 SCREEN FOR COLON CANCER: ICD-10-CM

## 2022-01-18 PROCEDURE — 88305 TISSUE EXAM BY PATHOLOGIST: CPT | Performed by: SURGERY

## 2022-01-18 NOTE — PROCEDURES
[facility] SURG[facility] CENTER OPERATIVE REPORT:     PATIENT NAME: Meghan Carrillo  : 1965   MRN: [id number]  SITE: [facility]    DATE OF OPERATION:  2022      PREOPERATIVE DIAGNOSIS: Colon screening     POSTOPERATIVE DIAGNOSIS: the same Ascending reveals no masses present. The Olympus colonoscope was advanced through the anus, and up to the cecum with out difficulty.      The prep was 3 Fair (semisolid stool could not be suctioned, but >90% of mucosa seen)     Circumferential visualization of the c minutes. the patient will call the office in one week for a follow-up appointment    The patient will have a repeat colonoscopy will be determined based on pathology results of the polypectomy of the rectal polyp.   Patient may require surgical trans

## 2022-01-21 PROBLEM — K62.89 RECTAL MASS: Status: ACTIVE | Noted: 2022-01-21

## 2022-01-25 ENCOUNTER — LAB ENCOUNTER (OUTPATIENT)
Dept: LAB | Facility: HOSPITAL | Age: 57
End: 2022-01-25
Attending: SURGERY
Payer: COMMERCIAL

## 2022-01-25 DIAGNOSIS — Z01.818 PRE-OP TESTING: ICD-10-CM

## 2022-01-25 LAB
ANTIBODY SCREEN: NEGATIVE
RH BLOOD TYPE: POSITIVE

## 2022-01-25 PROCEDURE — 86850 RBC ANTIBODY SCREEN: CPT

## 2022-01-25 PROCEDURE — 86901 BLOOD TYPING SEROLOGIC RH(D): CPT

## 2022-01-25 PROCEDURE — 36415 COLL VENOUS BLD VENIPUNCTURE: CPT

## 2022-01-25 PROCEDURE — 86900 BLOOD TYPING SEROLOGIC ABO: CPT

## 2022-01-26 LAB — SARS-COV-2 RNA RESP QL NAA+PROBE: DETECTED

## 2022-03-07 NOTE — PAT NURSING NOTE
Pre-op call to f/u on COVID infection 1/25, pt had loss of taste the evening before, lasted 2 days. No symptoms since, pt denies current symptoms. Informed pt he needs T&S today or tomorrow through our system, he agrees but wants to talk to his surgeon about the need for this.

## 2022-03-08 ENCOUNTER — LAB ENCOUNTER (OUTPATIENT)
Dept: LAB | Facility: HOSPITAL | Age: 57
End: 2022-03-08
Attending: SURGERY
Payer: COMMERCIAL

## 2022-03-08 DIAGNOSIS — Z01.818 PRE-OP TESTING: ICD-10-CM

## 2022-03-08 LAB
ANTIBODY SCREEN: NEGATIVE
RH BLOOD TYPE: POSITIVE

## 2022-03-08 PROCEDURE — 86850 RBC ANTIBODY SCREEN: CPT

## 2022-03-08 PROCEDURE — 86901 BLOOD TYPING SEROLOGIC RH(D): CPT

## 2022-03-08 PROCEDURE — 86900 BLOOD TYPING SEROLOGIC ABO: CPT

## 2022-03-08 PROCEDURE — 36415 COLL VENOUS BLD VENIPUNCTURE: CPT

## 2022-03-09 RX ORDER — HEPARIN SODIUM 5000 [USP'U]/ML
5000 INJECTION, SOLUTION INTRAVENOUS; SUBCUTANEOUS ONCE
Status: CANCELLED | OUTPATIENT
Start: 2022-03-09 | End: 2022-03-09

## 2022-03-09 RX ORDER — ALVIMOPAN 12 MG/1
12 CAPSULE ORAL ONCE
Status: CANCELLED | OUTPATIENT
Start: 2022-03-09 | End: 2022-03-09

## 2022-03-10 ENCOUNTER — ANESTHESIA EVENT (OUTPATIENT)
Dept: SURGERY | Facility: HOSPITAL | Age: 57
DRG: 349 | End: 2022-03-10
Payer: COMMERCIAL

## 2022-03-10 ENCOUNTER — HOSPITAL ENCOUNTER (INPATIENT)
Facility: HOSPITAL | Age: 57
LOS: 1 days | Discharge: HOME | DRG: 349 | End: 2022-03-11
Attending: SURGERY | Admitting: HOSPITALIST
Payer: COMMERCIAL

## 2022-03-10 ENCOUNTER — ANESTHESIA (OUTPATIENT)
Dept: SURGERY | Facility: HOSPITAL | Age: 57
DRG: 349 | End: 2022-03-10
Payer: COMMERCIAL

## 2022-03-10 DIAGNOSIS — K62.89 RECTAL MASS: ICD-10-CM

## 2022-03-10 DIAGNOSIS — Z01.818 PRE-OP TESTING: Primary | ICD-10-CM

## 2022-03-10 PROBLEM — C20 RECTAL CANCER (HCC): Status: ACTIVE | Noted: 2022-03-10

## 2022-03-10 PROCEDURE — 0DBP7ZZ EXCISION OF RECTUM, VIA NATURAL OR ARTIFICIAL OPENING: ICD-10-PCS | Performed by: SURGERY

## 2022-03-10 PROCEDURE — 99232 SBSQ HOSP IP/OBS MODERATE 35: CPT | Performed by: HOSPITALIST

## 2022-03-10 RX ORDER — ACETAMINOPHEN 500 MG
1000 TABLET ORAL ONCE
Status: COMPLETED | OUTPATIENT
Start: 2022-03-10 | End: 2022-03-10

## 2022-03-10 RX ORDER — ACETAMINOPHEN 500 MG
1000 TABLET ORAL EVERY 8 HOURS
Status: DISCONTINUED | OUTPATIENT
Start: 2022-03-10 | End: 2022-03-11

## 2022-03-10 RX ORDER — TRAMADOL HYDROCHLORIDE 50 MG/1
50 TABLET ORAL EVERY 6 HOURS PRN
Qty: 10 TABLET | Refills: 0 | Status: SHIPPED | OUTPATIENT
Start: 2022-03-10

## 2022-03-10 RX ORDER — SODIUM CHLORIDE, SODIUM LACTATE, POTASSIUM CHLORIDE, CALCIUM CHLORIDE 600; 310; 30; 20 MG/100ML; MG/100ML; MG/100ML; MG/100ML
INJECTION, SOLUTION INTRAVENOUS CONTINUOUS
Status: DISCONTINUED | OUTPATIENT
Start: 2022-03-10 | End: 2022-03-11

## 2022-03-10 RX ORDER — DEXTROSE, SODIUM CHLORIDE, AND POTASSIUM CHLORIDE 5; .45; .15 G/100ML; G/100ML; G/100ML
INJECTION INTRAVENOUS CONTINUOUS
Status: DISCONTINUED | OUTPATIENT
Start: 2022-03-10 | End: 2022-03-11

## 2022-03-10 RX ORDER — GLYCOPYRROLATE 0.2 MG/ML
INJECTION, SOLUTION INTRAMUSCULAR; INTRAVENOUS AS NEEDED
Status: DISCONTINUED | OUTPATIENT
Start: 2022-03-10 | End: 2022-03-10 | Stop reason: SURG

## 2022-03-10 RX ORDER — MORPHINE SULFATE 4 MG/ML
4 INJECTION, SOLUTION INTRAMUSCULAR; INTRAVENOUS EVERY 10 MIN PRN
Status: DISCONTINUED | OUTPATIENT
Start: 2022-03-10 | End: 2022-03-10 | Stop reason: HOSPADM

## 2022-03-10 RX ORDER — ONDANSETRON 2 MG/ML
4 INJECTION INTRAMUSCULAR; INTRAVENOUS ONCE AS NEEDED
Status: DISCONTINUED | OUTPATIENT
Start: 2022-03-10 | End: 2022-03-10 | Stop reason: HOSPADM

## 2022-03-10 RX ORDER — FAMOTIDINE 10 MG/ML
20 INJECTION, SOLUTION INTRAVENOUS 2 TIMES DAILY
Status: DISCONTINUED | OUTPATIENT
Start: 2022-03-10 | End: 2022-03-11

## 2022-03-10 RX ORDER — HYDROMORPHONE HYDROCHLORIDE 1 MG/ML
0.6 INJECTION, SOLUTION INTRAMUSCULAR; INTRAVENOUS; SUBCUTANEOUS EVERY 5 MIN PRN
Status: DISCONTINUED | OUTPATIENT
Start: 2022-03-10 | End: 2022-03-10 | Stop reason: HOSPADM

## 2022-03-10 RX ORDER — DIBUCAINE 0.28 G/28G
OINTMENT TOPICAL AS NEEDED
Status: DISCONTINUED | OUTPATIENT
Start: 2022-03-10 | End: 2022-03-10 | Stop reason: HOSPADM

## 2022-03-10 RX ORDER — BUPIVACAINE HYDROCHLORIDE AND EPINEPHRINE 2.5; 5 MG/ML; UG/ML
INJECTION, SOLUTION INFILTRATION; PERINEURAL AS NEEDED
Status: DISCONTINUED | OUTPATIENT
Start: 2022-03-10 | End: 2022-03-10 | Stop reason: HOSPADM

## 2022-03-10 RX ORDER — NALOXONE HYDROCHLORIDE 0.4 MG/ML
80 INJECTION, SOLUTION INTRAMUSCULAR; INTRAVENOUS; SUBCUTANEOUS AS NEEDED
Status: DISCONTINUED | OUTPATIENT
Start: 2022-03-10 | End: 2022-03-10 | Stop reason: HOSPADM

## 2022-03-10 RX ORDER — DOCUSATE SODIUM 100 MG/1
100 CAPSULE, LIQUID FILLED ORAL 2 TIMES DAILY
Qty: 14 CAPSULE | Refills: 1 | Status: SHIPPED | OUTPATIENT
Start: 2022-03-10 | End: 2022-03-17

## 2022-03-10 RX ORDER — DEXAMETHASONE SODIUM PHOSPHATE 4 MG/ML
VIAL (ML) INJECTION AS NEEDED
Status: DISCONTINUED | OUTPATIENT
Start: 2022-03-10 | End: 2022-03-10 | Stop reason: SURG

## 2022-03-10 RX ORDER — HYDROMORPHONE HYDROCHLORIDE 1 MG/ML
0.2 INJECTION, SOLUTION INTRAMUSCULAR; INTRAVENOUS; SUBCUTANEOUS EVERY 5 MIN PRN
Status: DISCONTINUED | OUTPATIENT
Start: 2022-03-10 | End: 2022-03-10 | Stop reason: HOSPADM

## 2022-03-10 RX ORDER — HYDROCODONE BITARTRATE AND ACETAMINOPHEN 5; 325 MG/1; MG/1
1 TABLET ORAL AS NEEDED
Status: DISCONTINUED | OUTPATIENT
Start: 2022-03-10 | End: 2022-03-10 | Stop reason: HOSPADM

## 2022-03-10 RX ORDER — LIDOCAINE 50 MG/G
1 CREAM RECTAL AS DIRECTED
Qty: 1 EACH | Refills: 1 | Status: SHIPPED | OUTPATIENT
Start: 2022-03-10

## 2022-03-10 RX ORDER — MORPHINE SULFATE 4 MG/ML
4 INJECTION, SOLUTION INTRAMUSCULAR; INTRAVENOUS EVERY 2 HOUR PRN
Status: DISCONTINUED | OUTPATIENT
Start: 2022-03-10 | End: 2022-03-11

## 2022-03-10 RX ORDER — SODIUM CHLORIDE, SODIUM LACTATE, POTASSIUM CHLORIDE, CALCIUM CHLORIDE 600; 310; 30; 20 MG/100ML; MG/100ML; MG/100ML; MG/100ML
INJECTION, SOLUTION INTRAVENOUS CONTINUOUS
Status: DISCONTINUED | OUTPATIENT
Start: 2022-03-10 | End: 2022-03-10 | Stop reason: HOSPADM

## 2022-03-10 RX ORDER — HYDROCODONE BITARTRATE AND ACETAMINOPHEN 5; 325 MG/1; MG/1
2 TABLET ORAL AS NEEDED
Status: DISCONTINUED | OUTPATIENT
Start: 2022-03-10 | End: 2022-03-10 | Stop reason: HOSPADM

## 2022-03-10 RX ORDER — METOCLOPRAMIDE 10 MG/1
10 TABLET ORAL ONCE
Status: COMPLETED | OUTPATIENT
Start: 2022-03-10 | End: 2022-03-10

## 2022-03-10 RX ORDER — MORPHINE SULFATE 2 MG/ML
2 INJECTION, SOLUTION INTRAMUSCULAR; INTRAVENOUS EVERY 2 HOUR PRN
Status: DISCONTINUED | OUTPATIENT
Start: 2022-03-10 | End: 2022-03-11

## 2022-03-10 RX ORDER — CELECOXIB 100 MG/1
100 CAPSULE ORAL 2 TIMES DAILY
Status: DISCONTINUED | OUTPATIENT
Start: 2022-03-10 | End: 2022-03-11

## 2022-03-10 RX ORDER — MORPHINE SULFATE 10 MG/ML
6 INJECTION, SOLUTION INTRAMUSCULAR; INTRAVENOUS EVERY 10 MIN PRN
Status: DISCONTINUED | OUTPATIENT
Start: 2022-03-10 | End: 2022-03-10 | Stop reason: HOSPADM

## 2022-03-10 RX ORDER — FAMOTIDINE 20 MG/1
20 TABLET, FILM COATED ORAL ONCE
Status: COMPLETED | OUTPATIENT
Start: 2022-03-10 | End: 2022-03-10

## 2022-03-10 RX ORDER — PROCHLORPERAZINE EDISYLATE 5 MG/ML
5 INJECTION INTRAMUSCULAR; INTRAVENOUS ONCE AS NEEDED
Status: DISCONTINUED | OUTPATIENT
Start: 2022-03-10 | End: 2022-03-10 | Stop reason: HOSPADM

## 2022-03-10 RX ORDER — TRAMADOL HYDROCHLORIDE 50 MG/1
50 TABLET ORAL EVERY 6 HOURS PRN
Status: DISCONTINUED | OUTPATIENT
Start: 2022-03-10 | End: 2022-03-11

## 2022-03-10 RX ORDER — HEPARIN SODIUM 5000 [USP'U]/ML
5000 INJECTION, SOLUTION INTRAVENOUS; SUBCUTANEOUS EVERY 12 HOURS
Status: DISCONTINUED | OUTPATIENT
Start: 2022-03-10 | End: 2022-03-11

## 2022-03-10 RX ORDER — NEOSTIGMINE METHYLSULFATE 1 MG/ML
INJECTION INTRAVENOUS AS NEEDED
Status: DISCONTINUED | OUTPATIENT
Start: 2022-03-10 | End: 2022-03-10 | Stop reason: SURG

## 2022-03-10 RX ORDER — HYDROMORPHONE HYDROCHLORIDE 1 MG/ML
0.4 INJECTION, SOLUTION INTRAMUSCULAR; INTRAVENOUS; SUBCUTANEOUS EVERY 5 MIN PRN
Status: DISCONTINUED | OUTPATIENT
Start: 2022-03-10 | End: 2022-03-10 | Stop reason: HOSPADM

## 2022-03-10 RX ORDER — MIDAZOLAM HYDROCHLORIDE 1 MG/ML
INJECTION INTRAMUSCULAR; INTRAVENOUS AS NEEDED
Status: DISCONTINUED | OUTPATIENT
Start: 2022-03-10 | End: 2022-03-10 | Stop reason: SURG

## 2022-03-10 RX ORDER — ONDANSETRON 2 MG/ML
INJECTION INTRAMUSCULAR; INTRAVENOUS AS NEEDED
Status: DISCONTINUED | OUTPATIENT
Start: 2022-03-10 | End: 2022-03-10 | Stop reason: SURG

## 2022-03-10 RX ORDER — ONDANSETRON 2 MG/ML
4 INJECTION INTRAMUSCULAR; INTRAVENOUS EVERY 6 HOURS PRN
Status: DISCONTINUED | OUTPATIENT
Start: 2022-03-10 | End: 2022-03-11

## 2022-03-10 RX ORDER — MORPHINE SULFATE 4 MG/ML
2 INJECTION, SOLUTION INTRAMUSCULAR; INTRAVENOUS EVERY 10 MIN PRN
Status: DISCONTINUED | OUTPATIENT
Start: 2022-03-10 | End: 2022-03-10 | Stop reason: HOSPADM

## 2022-03-10 RX ORDER — ROCURONIUM BROMIDE 10 MG/ML
INJECTION, SOLUTION INTRAVENOUS AS NEEDED
Status: DISCONTINUED | OUTPATIENT
Start: 2022-03-10 | End: 2022-03-10 | Stop reason: SURG

## 2022-03-10 RX ADMIN — GLYCOPYRROLATE 0.4 MG: 0.2 INJECTION, SOLUTION INTRAMUSCULAR; INTRAVENOUS at 15:43:00

## 2022-03-10 RX ADMIN — SODIUM CHLORIDE, SODIUM LACTATE, POTASSIUM CHLORIDE, CALCIUM CHLORIDE: 600; 310; 30; 20 INJECTION, SOLUTION INTRAVENOUS at 15:46:00

## 2022-03-10 RX ADMIN — SODIUM CHLORIDE, SODIUM LACTATE, POTASSIUM CHLORIDE, CALCIUM CHLORIDE: 600; 310; 30; 20 INJECTION, SOLUTION INTRAVENOUS at 13:50:00

## 2022-03-10 RX ADMIN — MIDAZOLAM HYDROCHLORIDE 2 MG: 1 INJECTION INTRAMUSCULAR; INTRAVENOUS at 13:57:00

## 2022-03-10 RX ADMIN — ROCURONIUM BROMIDE 10 MG: 10 INJECTION, SOLUTION INTRAVENOUS at 14:35:00

## 2022-03-10 RX ADMIN — DEXAMETHASONE SODIUM PHOSPHATE 4 MG: 4 MG/ML VIAL (ML) INJECTION at 13:57:00

## 2022-03-10 RX ADMIN — NEOSTIGMINE METHYLSULFATE 4 MG: 1 INJECTION INTRAVENOUS at 15:43:00

## 2022-03-10 RX ADMIN — ONDANSETRON 4 MG: 2 INJECTION INTRAMUSCULAR; INTRAVENOUS at 13:57:00

## 2022-03-10 RX ADMIN — SODIUM CHLORIDE, SODIUM LACTATE, POTASSIUM CHLORIDE, CALCIUM CHLORIDE: 600; 310; 30; 20 INJECTION, SOLUTION INTRAVENOUS at 14:35:00

## 2022-03-10 RX ADMIN — ROCURONIUM BROMIDE 40 MG: 10 INJECTION, SOLUTION INTRAVENOUS at 13:57:00

## 2022-03-10 NOTE — ANESTHESIA POSTPROCEDURE EVALUATION
Patient: Jeff Gilliland    Procedure Summary     Date: 03/10/22 Room / Location: [address] [place] [address] MAIN OR 05 / [address] [place] [address] MAIN OR    Anesthesia Start: 2015 Anesthesia Stop: 8915    Procedure: TRANSANAL EXCISION OF RECTAL TUMOR (N/A Anus) Diagnosis:       Rectal mass      (Rectal mass [K62.89])    Surgeons: Emilie Arenas MD Anesthesiologist: Alicia Zacarias MD    Anesthesia Type: general ASA Status: 2          Anesthesia Type: general    Vitals Value Taken Time   /82 03/10/22 1558   Temp 97.7 03/10/22 1558   Pulse 75 03/10/22 1558   Resp 15 03/10/22 1558   SpO2 94 % 03/10/22 1558   Vitals shown include unvalidated device data.     [address] [place] [address] AN Post Evaluation:   Patient Evaluated in PACU  Patient Participation: complete - patient participated  Level of Consciousness: awake  Pain Score: 0  Pain Management: adequate  Airway Patency:patent  Dental exam unchanged from preop  Yes    Cardiovascular Status: acceptable  Respiratory Status: acceptable  Postoperative Hydration acceptable      Laisha Tsai MD  3/10/2022 3:58 PM

## 2022-03-10 NOTE — INTERVAL H&P NOTE
Pre-op Diagnosis: Rectal mass [K62.89]    The above referenced H&P was reviewed by Rupinder High MD on 3/10/2022, the patient was examined and no significant changes have occurred in the patient's condition since the H&P was performed. I discussed with the patient and/or legal representative the potential benefits, risks and side effects of this procedure; the likelihood of the patient achieving goals; and potential problems that might occur during recuperation. I discussed reasonable alternatives to the procedure, including risks, benefits and side effects related to the alternatives and risks related to not receiving this procedure. We will proceed with procedure as planned.

## 2022-03-10 NOTE — ANESTHESIA PROCEDURE NOTES
Airway  Date/Time: 3/10/2022 1:59 PM  Urgency: Elective    Airway not difficult    General Information and Staff    Patient location during procedure: OR  Anesthesiologist: Kimberly Kowalski MD  Performed: anesthesiologist     Indications and Patient Condition  Indications for airway management: anesthesia  Spontaneous ventilation: present  Sedation level: deep  Preoxygenated: yes  Patient position: sniffing  Mask difficulty assessment: 1 - vent by mask    Final Airway Details  Final airway type: endotracheal airway      Successful airway: ETT  Cuffed: yes   Successful intubation technique: direct laryngoscopy  Endotracheal tube insertion site: oral  Blade: Brina  Blade size: #3  ETT size (mm): 8.0    Cormack-Lehane Classification: grade I - full view of glottis  Placement verified by: chest auscultation and capnometry   Cuff volume (mL): 8  Measured from: teeth  ETT to teeth (cm): 24  Number of attempts at approach: 1  Number of other approaches attempted: 0

## 2022-03-10 NOTE — BRIEF OP NOTE
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

## 2022-03-11 VITALS
TEMPERATURE: 98 F | RESPIRATION RATE: 18 BRPM | BODY MASS INDEX: 39.4 KG/M2 | DIASTOLIC BLOOD PRESSURE: 64 MMHG | OXYGEN SATURATION: 95 % | HEART RATE: 70 BPM | SYSTOLIC BLOOD PRESSURE: 111 MMHG | WEIGHT: 266 LBS | HEIGHT: 69 IN

## 2022-03-11 LAB
BASOPHILS # BLD AUTO: 0.01 X10(3) UL (ref 0–0.2)
BASOPHILS NFR BLD AUTO: 0.1 %
BUN BLD-MCNC: 11 MG/DL (ref 7–18)
BUN/CREAT SERPL: 10.8 (ref 10–20)
CALCIUM BLD-MCNC: 8.5 MG/DL (ref 8.5–10.1)
CHLORIDE SERPL-SCNC: 108 MMOL/L (ref 98–112)
CO2 SERPL-SCNC: 24 MMOL/L (ref 21–32)
CREAT BLD-MCNC: 1.02 MG/DL
DEPRECATED RDW RBC AUTO: 38.8 FL (ref 35.1–46.3)
EOSINOPHIL # BLD AUTO: 0 X10(3) UL (ref 0–0.7)
EOSINOPHIL NFR BLD AUTO: 0 %
ERYTHROCYTE [DISTWIDTH] IN BLOOD BY AUTOMATED COUNT: 12 % (ref 11–15)
GLUCOSE BLD-MCNC: 138 MG/DL (ref 70–99)
HCT VFR BLD AUTO: 42.6 %
HGB BLD-MCNC: 14.5 G/DL
IMM GRANULOCYTES # BLD AUTO: 0.08 X10(3) UL (ref 0–1)
IMM GRANULOCYTES NFR BLD: 0.6 %
LYMPHOCYTES # BLD AUTO: 0.89 X10(3) UL (ref 1–4)
LYMPHOCYTES NFR BLD AUTO: 6.9 %
MAGNESIUM SERPL-MCNC: 2.1 MG/DL (ref 1.6–2.6)
MCH RBC QN AUTO: 30.3 PG (ref 26–34)
MCHC RBC AUTO-ENTMCNC: 34 G/DL (ref 31–37)
MCV RBC AUTO: 88.9 FL
MONOCYTES # BLD AUTO: 0.84 X10(3) UL (ref 0.1–1)
MONOCYTES NFR BLD AUTO: 6.6 %
NEUTROPHILS # BLD AUTO: 10.99 X10 (3) UL (ref 1.5–7.7)
NEUTROPHILS # BLD AUTO: 10.99 X10(3) UL (ref 1.5–7.7)
NEUTROPHILS NFR BLD AUTO: 85.8 %
OSMOLALITY SERPL CALC.SUM OF ELEC: 290 MOSM/KG (ref 275–295)
PHOSPHATE SERPL-MCNC: 2.8 MG/DL (ref 2.5–4.9)
PLATELET # BLD AUTO: 236 10(3)UL (ref 150–450)
POTASSIUM SERPL-SCNC: 4.1 MMOL/L (ref 3.5–5.1)
RBC # BLD AUTO: 4.79 X10(6)UL
SODIUM SERPL-SCNC: 139 MMOL/L (ref 136–145)
WBC # BLD AUTO: 12.8 X10(3) UL (ref 4–11)

## 2022-03-11 PROCEDURE — 99239 HOSP IP/OBS DSCHRG MGMT >30: CPT | Performed by: HOSPITALIST

## 2022-03-11 RX ORDER — FAMOTIDINE 20 MG/1
20 TABLET, FILM COATED ORAL 2 TIMES DAILY
Status: DISCONTINUED | OUTPATIENT
Start: 2022-03-11 | End: 2022-03-11

## 2022-03-11 NOTE — OPERATIVE REPORT
[facility]    PATIENT'S NAME: Josie Miller   ATTENDING PHYSICIAN: Roseanne Bravo MD   OPERATING PHYSICIAN: Roseanne Bravo MD   PATIENT ACCOUNT#:   [de-identified]    LOCATION:  [address] [place] [address] #:   [id number]       YOB: 1965  ADMISSION DATE:       03/10/2022      OPERATION DATE:  03/10/2022    OPERATIVE REPORT    PREOPERATIVE DIAGNOSIS:  Rectal mass with high-grade dysplasia. POSTOPERATIVE DIAGNOSIS:  Rectal mass with high-grade dysplasia. PROCEDURE:  Transanal excision of rectal mass, full thickness. ASSISTANT:  Lala Iqbal PA-C. ANESTHESIA:  General endotracheal tube. ESTIMATED BLOOD LOSS:  20 mL. COMPLICATIONS:  None. INDICATIONS:  A 49-year-old white male who was found to have a large tubulovillous adenoma with high-grade dysplasia on routine screening colonoscopy. The mass could not be completely removed endoscopically. It involved approximately from 3 o'clock to 7 o'clock on the hands of the clock when looking at the anus. There was high-grade dysplasia present, but no evidence of invasive cancer. Given the above, I had extensive discussion with the patient as to etiology of the above. Discussed need for definitive excision. I had also discussed concerns for possible occult malignancy present. Risks of procedure including bleeding, infection, postoperative hematoma, need for further surgery if malignancy is present, positive margins, recurrent mass formation, as well as risk with anesthesia including myocardial infarction, respiratory failure, renal failure, pulmonary embolus, DVT, and even death were all discussed in detail with the patient who understands, consents, and wished to proceed with the operation. We will plan transanal excision of rectal mass full thickness. OPERATIVE TECHNIQUE:  Patient was brought to the operating room, placed on operating table in supine position.   General anesthetic was administered via endotracheal tube anesthesia. The patient was placed in lithotomy position with padding of all pressure points. Alvarez catheter was inserted. The perineum was prepped and draped in routine sterile fashion. The rectal mass was palpated approximately 3 o'clock location to 7 o'clock location. Rectal examination was performed. There was some liquid stool present. The rectum was irrigated with Betadine solution. Rectal retractors were inserted visualizing the high-grade dysplastic lesion. The proximal margin was visualized. Using a 2-0 chromic stay suture, this was placed above the extent of the tumor. Left lateral and right lateral margins were also placed with sutures. Next, electrocautery was used to score the mucosa with grossly negative margins. The mucosa was scored, and deep dissection into the subcutaneous tissue into the submucosa and muscular layer was performed. Full-thickness excision was performed excising the rectal mass as well as the portion of the muscle proximally in the left and right lateral.  This extended down to the region of the anal verge. This was elevated off the sphincteric muscles. The specimen was completely excised at the deep muscular margin. Specimen was oriented with needles on a needle tray and sent to Pathology. The specimen margins were the left lateral, right lateral, distal and proximal.  The specimen extended proximally from the hands of the clock from the 3 o'clock location to 7 o'clock location. At this time, the proximal mucosa of the rectum was visualized and transverse closure was performed using the 2-0, 180-day V-Loc running suture in 2 segments proximally to suture carrying distally. This was secured in place on both sides with running interlocking suture. Both sutures were secured in place and run back on themselves with locking suture. The sutures were trimmed. Final visualization revealed no further bleeding present.   Re-excision margins of the left lateral, right lateral, proximal and distal rectum was performed, and true margins were marked with suture. These were excised with the Metzenbaum scissor. There was full-thickness closure of the resection. Anastomosis was without tension. There was no narrowing present. Then, 0.25% Marcaine solution block was performed. Hemorrhoidal tampon with dibucaine ointment was inserted into the anus. Sterile dressings applied to the anus. Patient was transferred off the operative table to recovery room and extubated in stable condition. All counts were correct at the end of the case.     Dictated By Ilia Officer., MD  d: 03/10/2022 16:02:20  t: 03/10/2022 22:52:10  Job [id number]  MM/    cc: Early Officer.MD Dr. Shu

## 2022-03-23 ENCOUNTER — LAB ENCOUNTER (OUTPATIENT)
Dept: LAB | Facility: HOSPITAL | Age: 57
End: 2022-03-23
Attending: SURGERY
Payer: COMMERCIAL

## 2022-03-23 DIAGNOSIS — C20 RECTAL CANCER (HCC): ICD-10-CM

## 2022-03-23 LAB
ANION GAP SERPL CALC-SCNC: 5 MMOL/L (ref 0–18)
BASOPHILS # BLD AUTO: 0.06 X10(3) UL (ref 0–0.2)
BASOPHILS NFR BLD AUTO: 0.6 %
BUN BLD-MCNC: 14 MG/DL (ref 7–18)
BUN/CREAT SERPL: 13.9 (ref 10–20)
CALCIUM BLD-MCNC: 9.5 MG/DL (ref 8.5–10.1)
CHLORIDE SERPL-SCNC: 105 MMOL/L (ref 98–112)
CO2 SERPL-SCNC: 31 MMOL/L (ref 21–32)
CREAT BLD-MCNC: 1.01 MG/DL
DEPRECATED RDW RBC AUTO: 39.4 FL (ref 35.1–46.3)
EOSINOPHIL # BLD AUTO: 0.62 X10(3) UL (ref 0–0.7)
EOSINOPHIL NFR BLD AUTO: 6.2 %
ERYTHROCYTE [DISTWIDTH] IN BLOOD BY AUTOMATED COUNT: 11.9 % (ref 11–15)
FASTING STATUS PATIENT QL REPORTED: NO
GLUCOSE BLD-MCNC: 80 MG/DL (ref 70–99)
HCT VFR BLD AUTO: 45.6 %
HGB BLD-MCNC: 15.4 G/DL
IMM GRANULOCYTES # BLD AUTO: 0.05 X10(3) UL (ref 0–1)
IMM GRANULOCYTES NFR BLD: 0.5 %
LYMPHOCYTES # BLD AUTO: 2.48 X10(3) UL (ref 1–4)
LYMPHOCYTES NFR BLD AUTO: 24.7 %
MCH RBC QN AUTO: 30.3 PG (ref 26–34)
MCHC RBC AUTO-ENTMCNC: 33.8 G/DL (ref 31–37)
MCV RBC AUTO: 89.8 FL
MONOCYTES # BLD AUTO: 0.9 X10(3) UL (ref 0.1–1)
MONOCYTES NFR BLD AUTO: 9 %
NEUTROPHILS # BLD AUTO: 5.93 X10 (3) UL (ref 1.5–7.7)
NEUTROPHILS # BLD AUTO: 5.93 X10(3) UL (ref 1.5–7.7)
NEUTROPHILS NFR BLD AUTO: 59 %
OSMOLALITY SERPL CALC.SUM OF ELEC: 291 MOSM/KG (ref 275–295)
PLATELET # BLD AUTO: 297 10(3)UL (ref 150–450)
POTASSIUM SERPL-SCNC: 4.4 MMOL/L (ref 3.5–5.1)
RBC # BLD AUTO: 5.08 X10(6)UL
SODIUM SERPL-SCNC: 141 MMOL/L (ref 136–145)
WBC # BLD AUTO: 10 X10(3) UL (ref 4–11)

## 2022-03-23 PROCEDURE — 36415 COLL VENOUS BLD VENIPUNCTURE: CPT

## 2022-03-23 PROCEDURE — 80048 BASIC METABOLIC PNL TOTAL CA: CPT

## 2022-03-23 PROCEDURE — 85025 COMPLETE CBC W/AUTO DIFF WBC: CPT

## 2022-03-23 NOTE — PROGRESS NOTES
Karen Lucia WBC is normal indicating no evidence of infection. Please call me if you are having problems otherwise I will see you as scheduled. Thank you, Dr. Lima Rocha

## 2023-04-18 ENCOUNTER — SURGERY CENTER DOCUMENTATION (OUTPATIENT)
Dept: SURGERY | Age: 58
End: 2023-04-18

## 2023-04-18 ENCOUNTER — LAB REQUISITION (OUTPATIENT)
Dept: SURGERY | Age: 58
End: 2023-04-18
Payer: COMMERCIAL

## 2023-04-18 DIAGNOSIS — Z85.048 PERSONAL HISTORY OF MALIGNANT NEOPLASM OF RECTUM, RECTOSIGMOID JUNCTION, AND ANUS: ICD-10-CM

## 2023-04-18 PROCEDURE — 88305 TISSUE EXAM BY PATHOLOGIST: CPT | Performed by: SURGERY

## 2023-04-18 NOTE — PROCEDURES
[facility] SURGICAL CENTER OPERATIVE REPORT:     PATIENT NAME: Shanice Richards  : 1965   MRN: [id number]  SITE: [facility]    DATE OF OPERATION:  2023      PREOPERATIVE DIAGNOSIS: personal history of polyps     POSTOPERATIVE DIAGNOSIS: the same transverse colon polyp and internal hemorrhoids     PROCEDURE PERFORMED: Colonoscopy to the cecum, cold biopsy forcep polypectomy transverse colon polyp         SURGEON:  Helder Bazan MD    ANESTHESIA: Monitored Anesthesia Care (MAC)    ASA: per anesthesia    Total time of Moderate conscious sedation administered: per anesthesia      ESTIMATED BLOOD LOSS:   0 ml    INDICATIONS:  The patient is a 59year old male was undergoing a colonoscopy for  personal history of polyps. The risk of the procedure including bleeding, infection, perforation of the colon, perforation requiring laparotomy, missed polyps, post polypectomy bleed, delayed diagnosis, and inability confirm diagnosis, need for further surgical malignancy is present, as well as risk with anesthesia were discussed in detail with the patient understands, consents, wishes to proceed with the operation. OPERATION AND FINDINGS:  The patient was brought to the endoscopy suite and placed in left lateral decubitus position. The patient was continuously monitored with continuous pulse oximetry,  CO2 monitoring, blood pressure monitoring, EKG monitoring and supplemental nasal Oxygen was given. Graduated doses of intravenous moderate conscious sedation  was administered by anesthesiologist with Monitored Anesthesia Care (MAC). I was present, and administered the sedation,  as well as a trained sedation nurse observer was present to assist in monitoring the patient's level on consciousness and physiological status  during the entire length of moderate conscious sedation time. Rectal examination reveals no masses present.   The Olympus colonoscope was advanced through the anus, and up to the cecum without difficulty. The prep was 2 Good (clear liquid covering up to 25% of mucosa, but >90% of mucosa seen)     Circumferential visualization of the cecum appeared to be normal.   The opening of the ileocecal valve appeared to be normal. The terminal ileum was not cannulated. The scope was withdrawn, circumferential visualization of the ascending colon, hepatic flexure, transverse colon, splenic flexure, proximal descending colon appeared be normal.     A 0.6 cm  Sessile  polyp was seen in the transverse colon. cold biopsy forcep polypectomy  was performed removing the polyp. This was retrieved and sent to pathology. The distal descending and sigmoid colon had mild evidence of diverticulosis without evidence  diverticulitis present. The distal rectum was normal.   Retroflexion of the anus revealed Mild internal hemorrhoids. The scope was withdrawn and air was aspirated freely. No evidence of recurrent rectal polypoid mass. The patient tolerated the procedure well without any complications. Patient was  transferred to recovery room in stable condition. Cecal withdrawal time 7  minutes. the patient will call the office in one week for biopsy results    The patient will have a repeat colonoscopy in 5 years   with MAC anesthesia with peds scope  .         [address] [facility]    4/18/2023  84:50 PM  Faye Walters MD

## 2023-06-08 ENCOUNTER — WALK IN (OUTPATIENT)
Dept: URGENT CARE | Age: 58
End: 2023-06-08

## 2023-06-08 ENCOUNTER — APPOINTMENT (OUTPATIENT)
Dept: URGENT CARE | Age: 58
End: 2023-06-08

## 2023-06-08 DIAGNOSIS — Z11.1 SCREENING-PULMONARY TB: Primary | ICD-10-CM

## 2023-06-08 PROCEDURE — 86580 TB INTRADERMAL TEST: CPT | Performed by: NURSE PRACTITIONER

## 2023-06-10 ENCOUNTER — WALK IN (OUTPATIENT)
Dept: URGENT CARE | Age: 58
End: 2023-06-10

## 2023-06-10 DIAGNOSIS — Z11.1 ENCOUNTER FOR PPD SKIN TEST READING: Primary | ICD-10-CM

## 2023-06-10 LAB
INDURATION: 0 MM (ref 0–?)
SKIN TEST RESULT: NEGATIVE

## 2023-06-10 PROCEDURE — X1094 NO CHARGE VISIT: HCPCS | Performed by: NURSE PRACTITIONER

## 2025-06-12 ENCOUNTER — ANESTHESIA (OUTPATIENT)
Dept: SURGERY | Facility: HOSPITAL | Age: 60
End: 2025-06-12
Payer: COMMERCIAL

## 2025-06-12 ENCOUNTER — APPOINTMENT (OUTPATIENT)
Dept: ULTRASOUND IMAGING | Facility: HOSPITAL | Age: 60
End: 2025-06-12
Attending: EMERGENCY MEDICINE
Payer: COMMERCIAL

## 2025-06-12 ENCOUNTER — APPOINTMENT (OUTPATIENT)
Dept: GENERAL RADIOLOGY | Facility: HOSPITAL | Age: 60
End: 2025-06-12
Attending: SURGERY
Payer: COMMERCIAL

## 2025-06-12 ENCOUNTER — HOSPITAL ENCOUNTER (OUTPATIENT)
Facility: HOSPITAL | Age: 60
Setting detail: OBSERVATION
Discharge: HOME | End: 2025-06-14
Attending: EMERGENCY MEDICINE | Admitting: HOSPITALIST
Payer: COMMERCIAL

## 2025-06-12 ENCOUNTER — ANESTHESIA EVENT (OUTPATIENT)
Dept: SURGERY | Facility: HOSPITAL | Age: 60
End: 2025-06-12
Payer: COMMERCIAL

## 2025-06-12 DIAGNOSIS — K81.0 ACUTE CHOLECYSTITIS: Primary | ICD-10-CM

## 2025-06-12 LAB
ALBUMIN SERPL-MCNC: 4.8 G/DL (ref 3.2–4.8)
ALBUMIN/GLOB SERPL: 1.8 {RATIO} (ref 1–2)
ALP LIVER SERPL-CCNC: 79 U/L (ref 45–117)
ALT SERPL-CCNC: 33 U/L (ref 10–49)
ANION GAP SERPL CALC-SCNC: 9 MMOL/L (ref 0–18)
AST SERPL-CCNC: 31 U/L (ref ?–34)
ATRIAL RATE: 86 BPM
BASOPHILS # BLD AUTO: 0.05 X10(3) UL (ref 0–0.2)
BASOPHILS NFR BLD AUTO: 0.6 %
BILIRUB SERPL-MCNC: 0.6 MG/DL (ref 0.2–1.1)
BILIRUB UR QL: NEGATIVE
BUN BLD-MCNC: 12 MG/DL (ref 9–23)
BUN/CREAT SERPL: 9.2 (ref 10–20)
CALCIUM BLD-MCNC: 9.5 MG/DL (ref 8.7–10.4)
CHLORIDE SERPL-SCNC: 101 MMOL/L (ref 98–112)
CLARITY UR: CLEAR
CO2 SERPL-SCNC: 28 MMOL/L (ref 21–32)
CREAT BLD-MCNC: 1.3 MG/DL (ref 0.7–1.3)
DEPRECATED RDW RBC AUTO: 41.1 FL (ref 35.1–46.3)
EGFRCR SERPLBLD CKD-EPI 2021: 63 ML/MIN/1.73M2 (ref 60–?)
EOSINOPHIL # BLD AUTO: 0.02 X10(3) UL (ref 0–0.7)
EOSINOPHIL NFR BLD AUTO: 0.3 %
ERYTHROCYTE [DISTWIDTH] IN BLOOD BY AUTOMATED COUNT: 12.5 % (ref 11–15)
GLOBULIN PLAS-MCNC: 2.7 G/DL (ref 2–3.5)
GLUCOSE BLD-MCNC: 109 MG/DL (ref 70–99)
GLUCOSE UR-MCNC: NORMAL MG/DL
HCT VFR BLD AUTO: 44.4 % (ref 39–53)
HGB BLD-MCNC: 15.1 G/DL (ref 13–17.5)
HGB UR QL STRIP.AUTO: NEGATIVE
IMM GRANULOCYTES # BLD AUTO: 0.03 X10(3) UL (ref 0–1)
IMM GRANULOCYTES NFR BLD: 0.4 %
KETONES UR-MCNC: NEGATIVE MG/DL
LEUKOCYTE ESTERASE UR QL STRIP.AUTO: NEGATIVE
LIPASE SERPL-CCNC: 29 U/L (ref 12–53)
LYMPHOCYTES # BLD AUTO: 0.77 X10(3) UL (ref 1–4)
LYMPHOCYTES NFR BLD AUTO: 10 %
MCH RBC QN AUTO: 30.8 PG (ref 26–34)
MCHC RBC AUTO-ENTMCNC: 34 G/DL (ref 31–37)
MCV RBC AUTO: 90.4 FL (ref 80–100)
MONOCYTES # BLD AUTO: 0.98 X10(3) UL (ref 0.1–1)
MONOCYTES NFR BLD AUTO: 12.7 %
NEUTROPHILS # BLD AUTO: 5.87 X10 (3) UL (ref 1.5–7.7)
NEUTROPHILS # BLD AUTO: 5.87 X10(3) UL (ref 1.5–7.7)
NEUTROPHILS NFR BLD AUTO: 76 %
NITRITE UR QL STRIP.AUTO: NEGATIVE
OSMOLALITY SERPL CALC.SUM OF ELEC: 286 MOSM/KG (ref 275–295)
P AXIS: 40 DEGREES
P-R INTERVAL: 136 MS
PH UR: 5.5 [PH] (ref 5–8)
PLATELET # BLD AUTO: 189 10(3)UL (ref 150–450)
POTASSIUM SERPL-SCNC: 4.3 MMOL/L (ref 3.5–5.1)
PROT SERPL-MCNC: 7.5 G/DL (ref 5.7–8.2)
PROT UR-MCNC: NEGATIVE MG/DL
Q-T INTERVAL: 360 MS
QRS DURATION: 102 MS
QTC CALCULATION (BEZET): 430 MS
R AXIS: 72 DEGREES
RBC # BLD AUTO: 4.91 X10(6)UL (ref 4.3–5.7)
SODIUM SERPL-SCNC: 138 MMOL/L (ref 136–145)
SP GR UR STRIP: 1.01 (ref 1–1.03)
T AXIS: 62 DEGREES
UROBILINOGEN UR STRIP-ACNC: NORMAL
VENTRICULAR RATE: 86 BPM
WBC # BLD AUTO: 7.7 X10(3) UL (ref 4–11)

## 2025-06-12 PROCEDURE — 74300 X-RAY BILE DUCTS/PANCREAS: CPT | Performed by: SURGERY

## 2025-06-12 PROCEDURE — 99222 1ST HOSP IP/OBS MODERATE 55: CPT | Performed by: HOSPITALIST

## 2025-06-12 PROCEDURE — 76705 ECHO EXAM OF ABDOMEN: CPT | Performed by: EMERGENCY MEDICINE

## 2025-06-12 RX ORDER — INDOCYANINE GREEN AND WATER 25 MG
5 KIT INJECTION ONCE
Status: COMPLETED | OUTPATIENT
Start: 2025-06-12 | End: 2025-06-12

## 2025-06-12 RX ORDER — HYDROMORPHONE HYDROCHLORIDE 1 MG/ML
0.6 INJECTION, SOLUTION INTRAMUSCULAR; INTRAVENOUS; SUBCUTANEOUS EVERY 5 MIN PRN
Status: DISCONTINUED | OUTPATIENT
Start: 2025-06-12 | End: 2025-06-12 | Stop reason: HOSPADM

## 2025-06-12 RX ORDER — BUPIVACAINE HCL/EPINEPHRINE 0.25-.0005
VIAL (ML) INJECTION AS NEEDED
Status: DISCONTINUED | OUTPATIENT
Start: 2025-06-12 | End: 2025-06-12 | Stop reason: HOSPADM

## 2025-06-12 RX ORDER — NALOXONE HYDROCHLORIDE 0.4 MG/ML
0.08 INJECTION, SOLUTION INTRAMUSCULAR; INTRAVENOUS; SUBCUTANEOUS AS NEEDED
Status: DISCONTINUED | OUTPATIENT
Start: 2025-06-12 | End: 2025-06-12 | Stop reason: HOSPADM

## 2025-06-12 RX ORDER — ONDANSETRON 2 MG/ML
8 INJECTION INTRAMUSCULAR; INTRAVENOUS EVERY 6 HOURS PRN
Status: DISCONTINUED | OUTPATIENT
Start: 2025-06-12 | End: 2025-06-14

## 2025-06-12 RX ORDER — MORPHINE SULFATE 10 MG/ML
6 INJECTION, SOLUTION INTRAMUSCULAR; INTRAVENOUS EVERY 10 MIN PRN
Status: DISCONTINUED | OUTPATIENT
Start: 2025-06-12 | End: 2025-06-12 | Stop reason: HOSPADM

## 2025-06-12 RX ORDER — ONDANSETRON 2 MG/ML
INJECTION INTRAMUSCULAR; INTRAVENOUS AS NEEDED
Status: DISCONTINUED | OUTPATIENT
Start: 2025-06-12 | End: 2025-06-12 | Stop reason: SURG

## 2025-06-12 RX ORDER — ACETAMINOPHEN 10 MG/ML
INJECTION, SOLUTION INTRAVENOUS AS NEEDED
Status: DISCONTINUED | OUTPATIENT
Start: 2025-06-12 | End: 2025-06-12 | Stop reason: SURG

## 2025-06-12 RX ORDER — SODIUM CHLORIDE, SODIUM LACTATE, POTASSIUM CHLORIDE, CALCIUM CHLORIDE 600; 310; 30; 20 MG/100ML; MG/100ML; MG/100ML; MG/100ML
INJECTION, SOLUTION INTRAVENOUS CONTINUOUS
Status: DISCONTINUED | OUTPATIENT
Start: 2025-06-12 | End: 2025-06-12 | Stop reason: HOSPADM

## 2025-06-12 RX ORDER — FAMOTIDINE 10 MG/ML
20 INJECTION, SOLUTION INTRAVENOUS 2 TIMES DAILY
Status: DISCONTINUED | OUTPATIENT
Start: 2025-06-12 | End: 2025-06-14

## 2025-06-12 RX ORDER — MORPHINE SULFATE 4 MG/ML
4 INJECTION, SOLUTION INTRAMUSCULAR; INTRAVENOUS EVERY 10 MIN PRN
Status: DISCONTINUED | OUTPATIENT
Start: 2025-06-12 | End: 2025-06-12 | Stop reason: HOSPADM

## 2025-06-12 RX ORDER — LIDOCAINE HYDROCHLORIDE 10 MG/ML
INJECTION, SOLUTION EPIDURAL; INFILTRATION; INTRACAUDAL; PERINEURAL AS NEEDED
Status: DISCONTINUED | OUTPATIENT
Start: 2025-06-12 | End: 2025-06-12 | Stop reason: SURG

## 2025-06-12 RX ORDER — SODIUM CHLORIDE, SODIUM LACTATE, POTASSIUM CHLORIDE, CALCIUM CHLORIDE 600; 310; 30; 20 MG/100ML; MG/100ML; MG/100ML; MG/100ML
INJECTION, SOLUTION INTRAVENOUS CONTINUOUS PRN
Status: DISCONTINUED | OUTPATIENT
Start: 2025-06-12 | End: 2025-06-12 | Stop reason: SURG

## 2025-06-12 RX ORDER — DEXTROSE MONOHYDRATE, SODIUM CHLORIDE, AND POTASSIUM CHLORIDE 50; 1.49; 4.5 G/1000ML; G/1000ML; G/1000ML
INJECTION, SOLUTION INTRAVENOUS CONTINUOUS
Status: DISCONTINUED | OUTPATIENT
Start: 2025-06-12 | End: 2025-06-14

## 2025-06-12 RX ORDER — TRAMADOL HYDROCHLORIDE 50 MG/1
50 TABLET ORAL EVERY 6 HOURS PRN
Qty: 15 TABLET | Refills: 0 | Status: SHIPPED | OUTPATIENT
Start: 2025-06-12

## 2025-06-12 RX ORDER — ROCURONIUM BROMIDE 10 MG/ML
INJECTION, SOLUTION INTRAVENOUS AS NEEDED
Status: DISCONTINUED | OUTPATIENT
Start: 2025-06-12 | End: 2025-06-12 | Stop reason: SURG

## 2025-06-12 RX ORDER — TEMAZEPAM 15 MG/1
15 CAPSULE ORAL NIGHTLY PRN
Status: DISCONTINUED | OUTPATIENT
Start: 2025-06-12 | End: 2025-06-14

## 2025-06-12 RX ORDER — MORPHINE SULFATE 2 MG/ML
2 INJECTION, SOLUTION INTRAMUSCULAR; INTRAVENOUS EVERY 2 HOUR PRN
Status: DISCONTINUED | OUTPATIENT
Start: 2025-06-12 | End: 2025-06-14

## 2025-06-12 RX ORDER — ACETAMINOPHEN 500 MG
500 TABLET ORAL EVERY 4 HOURS PRN
Status: DISCONTINUED | OUTPATIENT
Start: 2025-06-12 | End: 2025-06-12

## 2025-06-12 RX ORDER — PHENYLEPHRINE HCL 10 MG/ML
VIAL (ML) INJECTION AS NEEDED
Status: DISCONTINUED | OUTPATIENT
Start: 2025-06-12 | End: 2025-06-12 | Stop reason: SURG

## 2025-06-12 RX ORDER — MORPHINE SULFATE 4 MG/ML
2 INJECTION, SOLUTION INTRAMUSCULAR; INTRAVENOUS EVERY 10 MIN PRN
Status: DISCONTINUED | OUTPATIENT
Start: 2025-06-12 | End: 2025-06-12 | Stop reason: HOSPADM

## 2025-06-12 RX ORDER — HYDROMORPHONE HYDROCHLORIDE 1 MG/ML
0.4 INJECTION, SOLUTION INTRAMUSCULAR; INTRAVENOUS; SUBCUTANEOUS EVERY 5 MIN PRN
Status: DISCONTINUED | OUTPATIENT
Start: 2025-06-12 | End: 2025-06-12 | Stop reason: HOSPADM

## 2025-06-12 RX ORDER — IBUPROFEN 200 MG
400 TABLET ORAL EVERY 6 HOURS PRN
COMMUNITY

## 2025-06-12 RX ORDER — SODIUM CHLORIDE 9 MG/ML
INJECTION, SOLUTION INTRAVENOUS CONTINUOUS
Status: DISCONTINUED | OUTPATIENT
Start: 2025-06-12 | End: 2025-06-12

## 2025-06-12 RX ORDER — HYDROMORPHONE HYDROCHLORIDE 1 MG/ML
0.2 INJECTION, SOLUTION INTRAMUSCULAR; INTRAVENOUS; SUBCUTANEOUS EVERY 5 MIN PRN
Status: DISCONTINUED | OUTPATIENT
Start: 2025-06-12 | End: 2025-06-12 | Stop reason: HOSPADM

## 2025-06-12 RX ORDER — DOCUSATE SODIUM 100 MG/1
100 CAPSULE, LIQUID FILLED ORAL 2 TIMES DAILY
Qty: 14 CAPSULE | Refills: 0 | Status: SHIPPED | OUTPATIENT
Start: 2025-06-12 | End: 2025-06-19

## 2025-06-12 RX ORDER — DEXAMETHASONE SODIUM PHOSPHATE 4 MG/ML
VIAL (ML) INJECTION AS NEEDED
Status: DISCONTINUED | OUTPATIENT
Start: 2025-06-12 | End: 2025-06-12 | Stop reason: SURG

## 2025-06-12 RX ORDER — MORPHINE SULFATE 4 MG/ML
4 INJECTION, SOLUTION INTRAMUSCULAR; INTRAVENOUS EVERY 2 HOUR PRN
Status: DISCONTINUED | OUTPATIENT
Start: 2025-06-12 | End: 2025-06-14

## 2025-06-12 RX ORDER — HEPARIN SODIUM 5000 [USP'U]/ML
5000 INJECTION, SOLUTION INTRAVENOUS; SUBCUTANEOUS EVERY 12 HOURS
Status: DISCONTINUED | OUTPATIENT
Start: 2025-06-13 | End: 2025-06-14

## 2025-06-12 RX ADMIN — PHENYLEPHRINE HCL 100 MCG: 10 MG/ML VIAL (ML) INJECTION at 18:57:00

## 2025-06-12 RX ADMIN — PHENYLEPHRINE HCL 100 MCG: 10 MG/ML VIAL (ML) INJECTION at 19:54:00

## 2025-06-12 RX ADMIN — PHENYLEPHRINE HCL 100 MCG: 10 MG/ML VIAL (ML) INJECTION at 19:22:00

## 2025-06-12 RX ADMIN — SODIUM CHLORIDE, SODIUM LACTATE, POTASSIUM CHLORIDE, CALCIUM CHLORIDE: 600; 310; 30; 20 INJECTION, SOLUTION INTRAVENOUS at 18:31:00

## 2025-06-12 RX ADMIN — ACETAMINOPHEN 1000 MG: 10 INJECTION, SOLUTION INTRAVENOUS at 18:52:00

## 2025-06-12 RX ADMIN — DEXAMETHASONE SODIUM PHOSPHATE 4 MG: 4 MG/ML VIAL (ML) INJECTION at 18:39:00

## 2025-06-12 RX ADMIN — PHENYLEPHRINE HCL 100 MCG: 10 MG/ML VIAL (ML) INJECTION at 19:25:00

## 2025-06-12 RX ADMIN — ONDANSETRON 4 MG: 2 INJECTION INTRAMUSCULAR; INTRAVENOUS at 18:39:00

## 2025-06-12 RX ADMIN — SODIUM CHLORIDE, SODIUM LACTATE, POTASSIUM CHLORIDE, CALCIUM CHLORIDE: 600; 310; 30; 20 INJECTION, SOLUTION INTRAVENOUS at 19:25:00

## 2025-06-12 RX ADMIN — PHENYLEPHRINE HCL 200 MCG: 10 MG/ML VIAL (ML) INJECTION at 18:59:00

## 2025-06-12 RX ADMIN — ROCURONIUM BROMIDE 50 MG: 10 INJECTION, SOLUTION INTRAVENOUS at 18:39:00

## 2025-06-12 RX ADMIN — PHENYLEPHRINE HCL 100 MCG: 10 MG/ML VIAL (ML) INJECTION at 19:53:00

## 2025-06-12 RX ADMIN — LIDOCAINE HYDROCHLORIDE 50 MG: 10 INJECTION, SOLUTION EPIDURAL; INFILTRATION; INTRACAUDAL; PERINEURAL at 18:39:00

## 2025-06-12 NOTE — ANESTHESIA PROCEDURE NOTES
Airway  Date/Time: 6/12/2025 6:44 PM  Reason: Elective    Airway not difficult    General Information and Staff   Patient location during procedure: OR  Anesthesiologist: Bairon Lan MD  Performed: anesthesiologist   Performed by: Bairon Lan MD  Authorized by: Bairon Lan MD        Indications and Patient Condition  Indications for airway management: anesthesia  Sedation level: deep      Preoxygenated: yesPatient position: sniffing    Mask difficulty assessment: 1 - vent by mask    Final Airway Details    Final airway type: endotracheal airway    Successful airway: ETT  Cuffed: yes   Successful intubation technique: Video laryngoscopy  Facilitating devices/methods: intubating stylet  Endotracheal tube insertion site: oral  Blade: GlideScope  Blade size: #4  ETT size (mm): 7.5    Cormack-Lehane Classification: grade I - full view of glottis  Placement verified by: capnometry   Measured from: teeth  Number of attempts at approach: 1

## 2025-06-12 NOTE — H&P
[facility]    PATIENT'S NAME: GABBI URIARTE   ATTENDING PHYSICIAN: Eric Claire MD   PATIENT ACCOUNT#:   [id number]    LOCATION:  [address]  MEDICAL RECORD #:   [id number]       YOB: 1965  ADMISSION DATE:       06/12/2025    HISTORY AND PHYSICAL EXAMINATION    CHIEF COMPLAINT:  Acute cholecystitis.     HISTORY OF PRESENT ILLNESS:  Patient is a 59-year-old  male, came in today to the emergency department for evaluation of intense right upper quadrant pain associated with nausea and vomiting since last night.  CBC, chemistry, and liver function tests were unremarkable.  Gallbladder ultrasound showed multiple stones, positive sonographic Barahona sign, pericholecystic fluid suspicious for acute cholecystitis.  Started on IV antibiotics, and he will be admitted to the hospital for further management.     PAST MEDICAL HISTORY:  History of Guillain-Collison syndrome, recovered.    PAST SURGICAL HISTORY:  Appendectomy and left kidney surgery.    MEDICATIONS:  Please see medication reconciliation list.     ALLERGIES:  No known drug allergies.     FAMILY HISTORY:  Father had heart disease.     SOCIAL HISTORY:  No tobacco or drug use.  Social alcohol.  Independent in his basic activities of daily living.     REVIEW OF SYSTEMS:  Intense right upper quadrant abdominal pain radiating to the back and right shoulder after eating a burger last night associated with nausea and vomiting and dry heaving.  Other 12-point review of systems is negative.       PHYSICAL EXAMINATION:    GENERAL:  Alert and oriented to time, place and person.  Mild to moderate distress.   VITAL SIGNS:  Temperature 99.8, pulse 76, respiratory rate 16, blood pressure 133/77, pulse ox 95% on room air.   HEENT:  Atraumatic.  Oropharynx clear.  Moist mucous membranes.  Ears and nose normal.  Eyes:  Anicteric sclerae.   NECK:  Supple.  No lymphadenopathy.  Trachea midline.  Full range of motion.   LUNGS:  Clear to  auscultation bilaterally.  Normal respiratory effort.   HEART:  Regular rate and rhythm.  S1 and S2 auscultated.  No murmur.    ABDOMEN:  Soft, nondistended.  Tenderness, right upper quadrant area.  No guarding or rebound tenderness.    EXTREMITIES:  No peripheral edema, clubbing or cyanosis.   NEUROLOGIC:  Motor and sensory intact.      ASSESSMENT:  Acute cholecystitis.      PLAN:  Patient will be admitted to general medical floor.  IV antibiotics.  Pain and nausea control.  Monitor hemodynamic status.  Keep n.p.o.  General surgery consult to evaluate patient for laparoscopic cholecystectomy; Dr. Peña was notified.   sequential compression devices for DVT prophylaxis.     Dictated By Preston Nunez MD  d: 06/12/2025 13:44:30  t: 06/12/2025 14:10:17  [facility] [id number]  FB/

## 2025-06-12 NOTE — CONSULTS
[facility]  part of [facility]                                                                             GENERAL SURGERY CONSULTATION    Quicny De Souza  :1965  CSN:[id number]  LOS:0    Date of Admission:  2025  Date of Consult:  2025     Reason for Consultation: cholecystitis     History of Present Illness:  Quincy De Souza is a a(n) 59 year old male who presented to the ED with right lower abdominal pain since yesterday afternoon. + nausea, no vomiting. + low grade fever in ER and not feeling well. W/up showed cholelithiasis and cholecystitis. No prior similar attacks.      History:  Past Medical History[1]   Past Surgical History[2]   Family History[3]   reports that he has never smoked. He has never used smokeless tobacco. He reports current alcohol use of about 6.0 standard drinks of alcohol per week. He reports that he does not currently use drugs.     Allergies:  Allergies[4]    Medications:   Current Hospital Medications[5]    Review of Systems:   Pertinent items are noted in HPI.  Constitutional: positive for anorexia and malaise  Eyes: negative  Ears, nose, mouth, throat, and face: negative  Respiratory: negative  Cardiovascular: negative  Gastrointestinal: positive for abdominal pain and nausea  Genitourinary:negative  Integument/breast: negative  Musculoskeletal:negative  Neurological: negative  Behavioral/Psych: negative    Physical Exam:   Vitals:    25 1030 25 1045 25 1100 25 1115   BP: 133/73      Pulse: 64 76 74 76   Resp: 18 18 16 16   Temp:       TempSrc:       SpO2: 94% 96% 95% 96%   Weight:       Height:          Body mass index is 39.87 kg/m².    General: no acute distress, uncomfortable, and obese  Pulmonary:lungs clear to auscultation bilaterally  Cardiac: RRR, nl S1,S2, no S3, no S4,  no murmurs, and no ectopy  Abdomen: normal BS, soft, nondistended, and mild tenderness RUQ to deep palpation, no mass, no guarding  Rectal  exam: deferred  Extremities: calves nontender, no edema, motor intact, and sensory intact    Laboratory Data:  Recent Labs   Lab 06/12/25 0927   RBC 4.91   HGB 15.1   HCT 44.4   MCV 90.4   MCH 30.8   MCHC 34.0   RDW 12.5   NEPRELIM 5.87   WBC 7.7   .0       Recent Labs   Lab 06/12/25 0927   *   BUN 12   CREATSERUM 1.30   CA 9.5      K 4.3      CO2 28.0       Lab Results   Component Value Date    WBC 7.7 06/12/2025    HGB 15.1 06/12/2025    HCT 44.4 06/12/2025    .0 06/12/2025     06/12/2025    K 4.3 06/12/2025     06/12/2025    CO2 28.0 06/12/2025    BUN 12 06/12/2025    CREATSERUM 1.30 06/12/2025     06/12/2025    BILT 0.6 06/12/2025    AST 31 06/12/2025    ALT 33 06/12/2025    LIP 29 06/12/2025    CA 9.5 06/12/2025    ALB 4.8 06/12/2025    TP 7.5 06/12/2025       US GALLBLADDER (CPT=76705)  Result Date: 6/12/2025  CONCLUSION: Gallbladder contains multiple stones.  Positive sonographic Barahona's sign.  Trace pericholecystic fluid.  Findings are suspicious for acute cholecystitis.   Dictated by (CST): Maciej Escamilla MD on 6/12/2025 at 11:31 AM     Finalized by (CST): Maciej Escamilla MD on 6/12/2025 at 11:35 AM            Impression and Plan:   Problem List[6]  Acute cholecystitis, cholelithiasis    The patient and his wife understood that surgery is the most effective treatment for his condition, was advised to undergo surgery, and preferred surgery today.  We will proceed with Laparoscopic Cholecystectomy with cholangiography.   The patient understood the indications, details and potential risks and complications of laparoscopic cholecystectomy including but not limited to: the need to convert to an open surgery, possible need for postoperative surgical procedure/ERCP, bile leak, bile duct injury, excessive bleeding, infection, post-cholecystectomy syndrome, diarrhea, damage to surrounding structures, etc.   The patient and his wife  consented to this treatment plan.         Lorenzo Bell MD   6/12/2025  12:41 PM                Time spent on counseling/coordination of care:  30 Minutes  Total time spent with patient:  45 Minutes         [1]   Past Medical History:   COVID    Guillain-[place] (HCC)   [2]   Past Surgical History:  Procedure Laterality Date    Appendectomy  10/30/2021    lap appe    Colonoscopy  01/18/2022    Kidney surgery Left     Kidney surgery      AT 2 YEARS OLD-DOESN'T REMEMBER WHAT    Other surgical history  10/30/2021    DRAINAGE OF ABSCESS   [3]   Family History  Problem Relation Age of Onset    Heart Disorder Father    [4] No Known Allergies  [5] No current facility-administered medications for this encounter.  [6]   Patient Active Problem List  Diagnosis    Acute appendicitis with perforation, localized peritonitis, and abscess, without gangrene    Encounter for screening colonoscopy    Rectal mass    Rectal cancer (HCC)    Acute cholecystitis

## 2025-06-12 NOTE — ED PROVIDER NOTES
Patient Seen in: [facility] Emergency Department    History     Chief Complaint   Patient presents with    Abdominal Pain       HPI    Patient presents to the ED complaining of pain in his right upper quadrant that started while he was at football practice yesterday.  He states that the pain was intense and radiated into his right shoulder causing him to sweat a lot.  He states pain improved but is ongoing today.  Took some ibuprofen and felt somewhat better after that.  Presents for ongoing symptoms.  No chest pain, no shortness of breath, no other complaints.    History reviewed. Past Medical History[1]    History reviewed. Past Surgical History[2]      Medications :  Prescriptions Prior to Admission[3]     Family History[4]    Smoking Status: Social Hx on file[5]    Constitutional and vital signs reviewed.      Social History and Family History elements reviewed from today, pertinent positives to the presenting problem noted.    Physical Exam     ED Triage Vitals [06/12/25 0818]   /78   Pulse 88   Resp 20   Temp 99.8 °F (37.7 °C)   Temp src Oral   SpO2 94 %   O2 Device None (Room air)       All measures to prevent infection transmission during my interaction with the patient were taken. Handwashing was performed prior to and after the exam.  Stethoscope and any equipment used during my examination was cleaned with super sani-cloth germicidal wipes following the exam.     Physical Exam  Vitals and nursing note reviewed.   Constitutional:       General: He is not in acute distress.     Appearance: He is well-developed. He is not ill-appearing or toxic-appearing.   HENT:      Head: Normocephalic and atraumatic.   Eyes:      General:         Right eye: No discharge.         Left eye: No discharge.      Conjunctiva/sclera: Conjunctivae normal.   Neck:      Trachea: No tracheal deviation.   Cardiovascular:      Rate and Rhythm: Normal rate.   Pulmonary:      Effort: Pulmonary effort is normal. No  respiratory distress.      Breath sounds: No stridor.   Abdominal:      General: There is no distension.      Palpations: Abdomen is soft.      Tenderness: There is abdominal tenderness in the right upper quadrant. There is no guarding or rebound.   Musculoskeletal:         General: No deformity.   Skin:     General: Skin is warm and dry.   Neurological:      Mental Status: He is alert and oriented to person, place, and time.   Psychiatric:         Mood and Affect: Mood normal.         Behavior: Behavior normal.         ED Course        Labs Reviewed   CBC WITH DIFFERENTIAL WITH PLATELET - Abnormal; Notable for the following components:       Result Value    Lymphocyte Absolute 0.77 (*)     All other components within normal limits   COMP METABOLIC PANEL (14) - Abnormal; Notable for the following components:    Glucose 109 (*)     BUN/CREA Ratio 9.2 (*)     All other components within normal limits   LIPASE - Normal   URINALYSIS WITH CULTURE REFLEX   RAINBOW DRAW BLUE     EKG    Rate, intervals and axes as noted on EKG Report.  Rate: Normal, 86 bpm  Rhythm: Sinus Rhythm  Reading: Normal intervals, normal segments, normal EKG           As Interpreted by me    Imaging Results Available and Reviewed while in ED: US GALLBLADDER (CPT=76705)  Result Date: 6/12/2025  CONCLUSION: Gallbladder contains multiple stones.  Positive sonographic Barahona's sign.  Trace pericholecystic fluid.  Findings are suspicious for acute cholecystitis.   Dictated by (CST): Maciej Escamilla MD on 6/12/2025 at 11:31 AM     Finalized by (CST): Maciej Escamilla MD on 6/12/2025 at 11:35 AM          ED Medications Administered:   Medications   morphINE PF 2 MG/ML injection 2 mg ( Intravenous MAR Hold 6/12/25 1741)     Or   morphINE PF 4 MG/ML injection 4 mg ( Intravenous MAR Hold 6/12/25 1741)   famotidine (Pepcid) 20 mg/2mL injection 20 mg ( Intravenous Automatically Held 6/15/25 2100)   piperacillin-tazobactam (Zosyn) 3.375 g in dextrose 5% 100 mL  IVPB-ADDV (has no administration in time range)   potassium chloride 20 mEq in dextrose 5%-sodium chloride 0.45% 1000mL infusion premix ( Intravenous New Bag 6/12/25 1542)   ondansetron (Zofran) 4 MG/2ML injection 8 mg ( Intravenous MAR Hold 6/12/25 1741)   sodium chloride 0.9% infusion ( Intravenous Not Given 6/12/25 1515)   acetaminophen (Tylenol Extra Strength) tab 500 mg ( Oral MAR Hold 6/12/25 1741)   temazepam (Restoril) cap 15 mg ( Oral MAR Hold 6/12/25 1741)   indocyanine green (IC-Green) injection 5 mg (5 mg Intravenous Given 6/12/25 1542)   piperacillin-tazobactam (Zosyn) 4.5 g in dextrose 5% 100 mL IVPB-ADDV (0 g Intravenous Stopped 6/12/25 1454)         MDM     Vitals:    06/12/25 1245 06/12/25 1445 06/12/25 1518 06/12/25 1747   BP: 133/77  138/67 (!) 169/81   BP Location:   Right arm Left arm   Pulse: 76 87 90 102   Resp: 16 18 19 18   Temp:    (!) 103 °F (39.4 °C)   TempSrc:    Oral   SpO2: 95% 96% 96% 95%   Weight:       Height:         *I personally reviewed and interpreted all ED vitals.    Pulse Ox: 95%, Room air, Normal     Monitor Interpretation:   normal sinus rhythm as interpreted by me.  The cardiac monitor was ordered to monitor heart rate.    Differential Diagnosis/ Diagnostic Considerations: Biliary colic, acute cholecystitis, intra-abdominal infection, choledocholithiasis, other    Complicating Factors: The patient already has does not have any pertinent problems on file. to contribute to the complexity of this ED evaluation.    Medical Decision Making  Patient presents to the ED with right upper quadrant Ed pain and concern for gallbladder disease.  Laboratory testing without concerning findings however gallbladder ultrasound shows evidence for acute cholecystitis with gallstones.  I discussed with Dr. Bell for surgical consultation and Dr. Nazario for admission.  Patient started on antibiotics in the ED.kosly     Problems Addressed:  Acute cholecystitis: acute illness or injury that  poses a threat to life or bodily functions    Amount and/or Complexity of Data Reviewed  Labs: ordered. Decision-making details documented in ED Course.  Radiology: ordered and independent interpretation performed. Decision-making details documented in ED Course.     Details: I personally viewed the patient's gallbladder ultrasound and noted multiple gallstones  ECG/medicine tests: ordered and independent interpretation performed. Decision-making details documented in ED Course.  Discussion of management or test interpretation with external provider(s): I discussed with Dr. Bell for surgical     Risk  Parenteral controlled substances.        Condition upon leaving the department: Stable    Disposition and Plan     Clinical Impression:  1. Acute cholecystitis        Disposition:  Admit    Follow-up:  No follow-up provider specified.    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   6/12/2025 Morning    Nutritional Supplements (JUICE PLUS FIBRE OR) Take 1 tablet by mouth daily.   6/12/2025 Morning    ibuprofen 200 MG Oral Tab Take 2 tablets (400 mg total) by mouth every 6 (six) hours as needed for Pain.   6/12/2025 at  6:30 AM    Multiple Vitamins-Minerals (CENTRUM) Oral Tab Take 1 tablet by mouth in the morning.   6/12/2025 Morning    [4]   Family History  Problem Relation Age of Onset    Heart Disorder Father    [5]   Social History  Socioeconomic History    Marital status:    Tobacco Use    Smoking status: Never    Smokeless tobacco: Never   Vaping Use    Vaping status: Never Used   Substance and Sexual Activity    Alcohol use: Yes     Alcohol/week: 6.0 standard drinks of alcohol     Types: 6 Cans of beer per week    Drug use: Not Currently

## 2025-06-12 NOTE — ANESTHESIA PREPROCEDURE EVALUATION
Anesthesia PreOp Note    HPI:     uQincy De Souza is a 59 year old male who presents for preoperative consultation requested by: Ap Peña MD    Date of Surgery: 6/12/2025    Procedure(s):  XI ROBOT-ASSISTED LAPAROSCOPIC CHOLECYSTECTOMY WITH CHOLANGIOGRAM  Indication: Acute cholecystitis    Relevant Problems   No relevant active problems       NPO:  Last Liquid Consumption Date: 06/12/25  Last Liquid Consumption Time: 0630  Last Solid Consumption Date: 06/12/25  Last Solid Consumption Time: 0630  Last Liquid Consumption Date: 06/12/25          History Review:  Patient Active Problem List    Diagnosis Date Noted    Acute cholecystitis 06/12/2025    Rectal cancer (HCC) 03/10/2022    Rectal mass 01/21/2022    Encounter for screening colonoscopy 11/17/2021    Acute appendicitis with perforation, localized peritonitis, and abscess, without gangrene 10/30/2021       Past Medical History[1]    Past Surgical History[2]    Prescriptions Prior to Admission[3]  Current Medications and Prescriptions Ordered in Epic[4]    Allergies[5]    Family History[6]  Social Hx on file[7]    Available pre-op labs reviewed.  Lab Results   Component Value Date    WBC 7.7 06/12/2025    RBC 4.91 06/12/2025    HGB 15.1 06/12/2025    HCT 44.4 06/12/2025    MCV 90.4 06/12/2025    MCH 30.8 06/12/2025    MCHC 34.0 06/12/2025    RDW 12.5 06/12/2025    .0 06/12/2025     Lab Results   Component Value Date     06/12/2025    K 4.3 06/12/2025     06/12/2025    CO2 28.0 06/12/2025    BUN 12 06/12/2025    CREATSERUM 1.30 06/12/2025     (H) 06/12/2025    CA 9.5 06/12/2025          Vital Signs:  Body mass index is 39.87 kg/m².   height is 1.753 m (5' 9\") and weight is 122.5 kg (270 lb). His oral temperature is 99.8 °F (37.7 °C). His blood pressure is 138/67 and his pulse is 90. His respiration is 19 and oxygen saturation is 96%.   Vitals:    06/12/25 1115 06/12/25 1245 06/12/25 1445 06/12/25 1518   BP:  133/77  138/67    Pulse: 76 76 87 90   Resp: 16 16 18 19   Temp:       TempSrc:       SpO2: 96% 95% 96% 96%   Weight:       Height:            Anesthesia Evaluation     Patient summary reviewed and Nursing notes reviewed    No history of anesthetic complications   Airway   Mallampati: I  TM distance: >3 FB  Neck ROM: full  Dental      Pulmonary - negative ROS and normal exam   Cardiovascular - negative ROS and normal exam    Neuro/Psych    (+)  neuromuscular disease,        GI/Hepatic/Renal      Comments: Cholecystitis     Endo/Other    Abdominal                  Anesthesia Plan:   ASA:  3  Plan:   General  Informed Consent Plan and Risks Discussed With:  Patient      I have informed Quincy De Souza and/or legal guardian or family member of the nature of the anesthetic plan, benefits, risks including possible dental damage if relevant, major complications, and any alternative forms of anesthetic management.   All of the patient's questions were answered to the best of my ability. The patient desires the anesthetic management as planned.  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   6/12/2025 Morning    Nutritional Supplements (JUICE PLUS FIBRE OR) Take 1 tablet by mouth daily.   6/12/2025 Morning    ibuprofen 200 MG Oral Tab Take 2 tablets (400 mg total) by mouth every 6 (six) hours as needed for Pain.   6/12/2025 at  6:30 AM    Multiple Vitamins-Minerals (CENTRUM) Oral Tab Take 1 tablet by mouth in the morning.    6/12/2025 Morning   [4]   Current Facility-Administered Medications Ordered in Epic   Medication Dose Route Frequency Provider Last Rate Last Admin    morphINE PF 2 MG/ML injection 2 mg  2 mg Intravenous Q2H PRN Ap Peña MD        Or    morphINE PF 4 MG/ML injection 4 mg  4 mg Intravenous Q2H PRN Ap Peña MD        famotidine (Pepcid) 20 mg/2mL injection 20 mg  20 mg Intravenous BID Ap Peña MD        piperacillin-tazobactam (Zosyn) 3.375 g in dextrose 5% 100 mL IVPB-ADDV  3.375 g Intravenous Q8H Ap Peña MD        potassium chloride 20 mEq in dextrose 5%-sodium chloride 0.45% 1000mL infusion premix   Intravenous Continuous Ap Peña  mL/hr at 06/12/25 1542 New Bag at 06/12/25 1542    ondansetron (Zofran) 4 MG/2ML injection 8 mg  8 mg Intravenous Q6H PRN Ap Peña MD        sodium chloride 0.9% infusion   Intravenous Continuous Preston Nunez MD        acetaminophen (Tylenol Extra Strength) tab 500 mg  500 mg Oral Q4H PRN Preston Nunez MD        temazepam (Restoril) cap 15 mg  15 mg Oral Nightly PRN Preston Nunez MD         No current [facility]-ordered outpatient medications on file.   [5] No Known Allergies  [6]   Family History  Problem Relation Age of Onset    Heart Disorder Father    [7]   Social History  Socioeconomic History    Marital status:    Tobacco Use    Smoking status: Never    Smokeless tobacco: Never   Vaping Use    Vaping status: Never Used   Substance and Sexual Activity    Alcohol use: Yes     Alcohol/week: 6.0 standard drinks of alcohol     Types: 6 Cans of beer per week    Drug use: Not Currently

## 2025-06-12 NOTE — ED QUICK NOTES
Orders for admission, patient is aware of plan and ready to go upstairs. Any questions, please call ED RN Estephania at extension [phone].     Patient Covid vaccination status: Unvaccinated     COVID Test Ordered in ED: None    COVID Suspicion at Admission: N/A    Running Infusions: see MAR     Mental Status/LOC at time of transport: A&Ox4/4     Other pertinent information:   CIWA score: N/A   NIH score:  N/A

## 2025-06-12 NOTE — ED INITIAL ASSESSMENT (HPI)
Pt came in for right rib pain, right abdominal pain started yesterday.  Per pt he was at work when he started sweating a lot.  Pt is A/OX 4, breathing unlabored.  History of rectal surgery,Appendectomy.  Took Ibuprofen 2 tabs an hour and a half ago.  Denies chest pain , shortness of breath.  Here with wife.

## 2025-06-13 ENCOUNTER — APPOINTMENT (OUTPATIENT)
Dept: GENERAL RADIOLOGY | Facility: HOSPITAL | Age: 60
End: 2025-06-13
Attending: SURGERY
Payer: COMMERCIAL

## 2025-06-13 LAB
ALBUMIN SERPL-MCNC: 3.9 G/DL (ref 3.2–4.8)
ALBUMIN SERPL-MCNC: 4.2 G/DL (ref 3.2–4.8)
ALP LIVER SERPL-CCNC: 76 U/L (ref 45–117)
ALP LIVER SERPL-CCNC: 79 U/L (ref 45–117)
ALT SERPL-CCNC: 74 U/L (ref 10–49)
ALT SERPL-CCNC: 91 U/L (ref 10–49)
ANION GAP SERPL CALC-SCNC: 8 MMOL/L (ref 0–18)
AST SERPL-CCNC: 83 U/L (ref ?–34)
AST SERPL-CCNC: 90 U/L (ref ?–34)
BASOPHILS # BLD AUTO: 0.02 X10(3) UL (ref 0–0.2)
BASOPHILS # BLD AUTO: 0.03 X10(3) UL (ref 0–0.2)
BASOPHILS NFR BLD AUTO: 0.2 %
BASOPHILS NFR BLD AUTO: 0.5 %
BILIRUB DIRECT SERPL-MCNC: 0.2 MG/DL (ref ?–0.3)
BILIRUB DIRECT SERPL-MCNC: 0.2 MG/DL (ref ?–0.3)
BILIRUB SERPL-MCNC: 0.6 MG/DL (ref 0.2–1.1)
BILIRUB SERPL-MCNC: 0.6 MG/DL (ref 0.2–1.1)
BUN BLD-MCNC: 9 MG/DL (ref 9–23)
BUN/CREAT SERPL: 7.7 (ref 10–20)
CALCIUM BLD-MCNC: 8.4 MG/DL (ref 8.7–10.4)
CHLORIDE SERPL-SCNC: 106 MMOL/L (ref 98–112)
CO2 SERPL-SCNC: 23 MMOL/L (ref 21–32)
CREAT BLD-MCNC: 1.17 MG/DL (ref 0.7–1.3)
DEPRECATED RDW RBC AUTO: 41.6 FL (ref 35.1–46.3)
DEPRECATED RDW RBC AUTO: 42.3 FL (ref 35.1–46.3)
EGFRCR SERPLBLD CKD-EPI 2021: 71 ML/MIN/1.73M2 (ref 60–?)
EOSINOPHIL # BLD AUTO: 0 X10(3) UL (ref 0–0.7)
EOSINOPHIL # BLD AUTO: 0.01 X10(3) UL (ref 0–0.7)
EOSINOPHIL NFR BLD AUTO: 0 %
EOSINOPHIL NFR BLD AUTO: 0.2 %
ERYTHROCYTE [DISTWIDTH] IN BLOOD BY AUTOMATED COUNT: 12.6 % (ref 11–15)
ERYTHROCYTE [DISTWIDTH] IN BLOOD BY AUTOMATED COUNT: 12.6 % (ref 11–15)
GLUCOSE BLD-MCNC: 155 MG/DL (ref 70–99)
HCT VFR BLD AUTO: 40.1 % (ref 39–53)
HCT VFR BLD AUTO: 40.3 % (ref 39–53)
HGB BLD-MCNC: 13.8 G/DL (ref 13–17.5)
HGB BLD-MCNC: 14.1 G/DL (ref 13–17.5)
IMM GRANULOCYTES # BLD AUTO: 0.02 X10(3) UL (ref 0–1)
IMM GRANULOCYTES # BLD AUTO: 0.04 X10(3) UL (ref 0–1)
IMM GRANULOCYTES NFR BLD: 0.3 %
IMM GRANULOCYTES NFR BLD: 0.4 %
LYMPHOCYTES # BLD AUTO: 0.42 X10(3) UL (ref 1–4)
LYMPHOCYTES # BLD AUTO: 0.59 X10(3) UL (ref 1–4)
LYMPHOCYTES NFR BLD AUTO: 4.7 %
LYMPHOCYTES NFR BLD AUTO: 9.8 %
MAGNESIUM SERPL-MCNC: 2 MG/DL (ref 1.6–2.6)
MCH RBC QN AUTO: 31 PG (ref 26–34)
MCH RBC QN AUTO: 31.1 PG (ref 26–34)
MCHC RBC AUTO-ENTMCNC: 34.4 G/DL (ref 31–37)
MCHC RBC AUTO-ENTMCNC: 35 G/DL (ref 31–37)
MCV RBC AUTO: 88.6 FL (ref 80–100)
MCV RBC AUTO: 90.3 FL (ref 80–100)
MONOCYTES # BLD AUTO: 0.45 X10(3) UL (ref 0.1–1)
MONOCYTES # BLD AUTO: 0.49 X10(3) UL (ref 0.1–1)
MONOCYTES NFR BLD AUTO: 5.4 %
MONOCYTES NFR BLD AUTO: 7.5 %
NEUTROPHILS # BLD AUTO: 4.92 X10 (3) UL (ref 1.5–7.7)
NEUTROPHILS # BLD AUTO: 4.92 X10(3) UL (ref 1.5–7.7)
NEUTROPHILS # BLD AUTO: 8.03 X10 (3) UL (ref 1.5–7.7)
NEUTROPHILS # BLD AUTO: 8.03 X10(3) UL (ref 1.5–7.7)
NEUTROPHILS NFR BLD AUTO: 81.7 %
NEUTROPHILS NFR BLD AUTO: 89.3 %
OSMOLALITY SERPL CALC.SUM OF ELEC: 286 MOSM/KG (ref 275–295)
PHOSPHATE SERPL-MCNC: 2.6 MG/DL (ref 2.4–5.1)
PLATELET # BLD AUTO: 146 10(3)UL (ref 150–450)
PLATELET # BLD AUTO: 169 10(3)UL (ref 150–450)
PLATELETS.RETICULATED NFR BLD AUTO: 1.9 % (ref 0–7)
POTASSIUM SERPL-SCNC: 4.5 MMOL/L (ref 3.5–5.1)
PROT SERPL-MCNC: 6.7 G/DL (ref 5.7–8.2)
PROT SERPL-MCNC: 6.7 G/DL (ref 5.7–8.2)
RBC # BLD AUTO: 4.44 X10(6)UL (ref 4.3–5.7)
RBC # BLD AUTO: 4.55 X10(6)UL (ref 4.3–5.7)
SODIUM SERPL-SCNC: 137 MMOL/L (ref 136–145)
WBC # BLD AUTO: 6 X10(3) UL (ref 4–11)
WBC # BLD AUTO: 9 X10(3) UL (ref 4–11)

## 2025-06-13 PROCEDURE — 71046 X-RAY EXAM CHEST 2 VIEWS: CPT | Performed by: SURGERY

## 2025-06-13 PROCEDURE — 99497 ADVNCD CARE PLAN 30 MIN: CPT | Performed by: INTERNAL MEDICINE

## 2025-06-13 PROCEDURE — 99233 SBSQ HOSP IP/OBS HIGH 50: CPT | Performed by: INTERNAL MEDICINE

## 2025-06-13 RX ORDER — ACETAMINOPHEN 325 MG/1
650 TABLET ORAL EVERY 6 HOURS PRN
Status: DISCONTINUED | OUTPATIENT
Start: 2025-06-13 | End: 2025-06-14

## 2025-06-13 NOTE — PHYSICAL THERAPY NOTE
PHYSICAL THERAPY EVALUATION - INPATIENT     Room Number: 444/444-A  Evaluation Date: 6/13/2025  Type of Evaluation: Initial   Physician Order: PT Eval and Treat    Presenting Problem: Acute cholecystitis S/P laparoscopic cholecystectomy     Reason for Therapy: Mobility Dysfunction and Discharge Planning    PHYSICAL THERAPY ASSESSMENT   Patient is a 59 year old male admitted 6/12/2025 for Acute cholecystitis S/P laparoscopic cholecystectomy.  Prior to admission, patient's baseline is completely I without AD.  Patient is currently functioning at baseline with bed mobility, transfers, gait, stair negotiation, standing prolonged periods, and performing household tasks.    PLAN  Patient has been evaluated and presents with no skilled Physical Therapy needs at this time.  Patient will be discharged from Physical Therapy services. Please re-order if a new functional limitation presents during this admission.    PT Device Recommendation: None    PHYSICAL THERAPY MEDICAL/SOCIAL HISTORY   History related to current admission: Acute cholecystitis S/P laparoscopic cholecystectomy     Problem List  Principal Problem:    Acute cholecystitis      HOME SITUATION  Type of Home: House  Home Layout: One level  Stairs to Enter : 1   Railing: Yes (1)    Lives With: Spouse    Drives: Yes   Patient Regularly Uses: None      Prior Level of [place]: completely I without AD    SUBJECTIVE  \" I feel okay\"    PHYSICAL THERAPY EXAMINATION   OBJECTIVE  Precautions: Abdominal protective strategies  Fall Risk: Standard fall risk      COGNITION  Overall Cognitive Status:  WFL - within functional limits    RANGE OF MOTION AND STRENGTH ASSESSMENT  Upper extremity ROM and strength are within functional limits B UE  Lower extremity ROM is within functional limits B LE  Lower extremity strength is within functional limits B LE    BALANCE  Static Sitting: Good  Dynamic Sitting: Good  Static Standing: Good  Dynamic Standing: Good    AM-PAC '6-Clicks'  INPATIENT SHORT FORM - BASIC MOBILITY  How much difficulty does the patient currently have...  Patient Difficulty: Turning over in bed (including adjusting bedclothes, sheets and blankets)?: None   Patient Difficulty: Sitting down on and standing up from a chair with arms (e.g., wheelchair, bedside commode, etc.): None   Patient Difficulty: Moving from lying on back to sitting on the side of the bed?: None   How much help from another person does the patient currently need...   Help from Another: Moving to and from a bed to a chair (including a wheelchair)?: None   Help from Another: Need to walk in hospital room?: None   Help from Another: Climbing 3-5 steps with a railing?: None     AM-PAC Score:  Raw Score: 24   Approx Degree of Impairment: 0%   Standardized Score (AM-PAC Scale): 61.14   CMS Modifier (G-Code): CH    FUNCTIONAL ABILITY STATUS  Functional Mobility/Gait Assessment  Gait Assistance: Modified independent  Distance (ft): 300  Assistive Device: None  Pattern: Within Functional Limits  Stairs: Stairs  How Many Stairs: 1  Device: 1 Rail  Assist: Modified independent  Pattern: Ascend and Descend  Ascend and Descend : Reciprocal  Rolling: modified independent  Supine to Sit: modified independent  Sit to Supine: NT  Sit to Stand: modified independent    Exercise/Education Provided:  Bed mobility  Gait training  Transfer training    Skilled Therapy Provided: Pt was cleared to participate with PT per RN. Pt was able to complete bed mob, functional transfers, ambulation without ad x 300ft at [facility] I without any LOB or safety concern. Pt was able to ascend/descend x 1 step/1 rail x 3 rounds at [facility] I. PT discussed to pt that since his baseline, PT will clear him per PT standpoint without any further PT skilled services recommended during the duration of this hospitalization and upon return to home with pt verbalized understanding and in agreement.     The patient's Approx Degree of Impairment: 0% has been  calculated based on documentation in the [facility] '6 clicks' Inpatient Basic Mobility Short Form.  Research supports that patients with this level of impairment may benefit from no further PT skilled services needed.  Final disposition will be made by interdisciplinary medical team.         Patient was able to achieve the following ...   Patient able to transfer  Safely and independently    Patient able to ambulate on level surfaces  Safely and independently     Patient Evaluation Complexity Level:  History Low - no personal factors and/or co-morbidities   Examination of body systems Low -  addressing 1-2 elements   Clinical Presentation Low- Stable   Clinical Decision Making  Low Complexity     Gait Trainin minutes  Therapeutic Activity:  4 minutes    Clara Sullivan, PT, DPT

## 2025-06-13 NOTE — OPERATIVE REPORT
[facility] OPERATING ROOM OPERATIVE REPORT:     PATIENT NAME: Quincy De Souza  : 1965   MRN: [id number]  SITE: [facility]    DATE OF OPERATION:   2025     PREOPERATIVE DIAGNOSIS: Acute on Chronic Cholecystitis, Cholelithiasis      POSTOPERATIVE DIAGNOSIS: Acute on Chronic Cholecystitis, Cholelithiasis     PROCEDURE PERFORMED: Robotic cholecystectomy with intraoperative cholangiography and with immunofluorescence cholangiography     SURGEON: Ap Peña MD    ASST:       Fransisco Juarez (Assistant helped position patient and helped with positioning, camera, retraction, suturing, closure, dressings etc.)    ANESTHESIA: General anesthesia     ESTIMATED BLOOD LOSS:   15 ml    COMPLICATIONS: none       INDICATIONS: This is a 59 year old male who has evidence ofAcute on Chronic cholecystitis due to Cholelithiasis.  I had a lengthy discussion with the patient as etiology of the above.  I discussed treatment options of continued observation versus oral dissolution versus lithotripsy versus surgical management.  Open versus laparoscopic versus robotic cholecystectomy were discussed in detail.  Risk of the procedure including bleeding, infection, postoperative hematoma, wound infection, nonhealing wound, scar formation, perforated viscus, vascular injury, bile duct injury, bile leak, retained common bile duct stones, need for, duct exploration, need to convert to an open procedure, as well as risk of anesthesia including myocardial infarction, respiratory failure, renal failure, pulmonalis, DVT, and even death were all discussed in detail with the patient understands consents and wishes to proceed with the operation.    Operation:  The patient was brought to the operating room and placed in the supine position. General anesthetic was administered via endotracheal tube by anesthesia.  . The patient's stomach was decompressed With an orogastric tube and the bladder was decompressed with  Alvarez catheter.  The abdomen was prepped and draped in routine sterile fashion.  Local anesthetic was anesthetized and injected into the umbilical region and all port sites. A small incision was made in the umbilicus.  A Veress needle was introduced into the abdominal cavity without difficulty.  Using saline drop test the place was confirmed and pneumoperitoneum was established approximately 15 mmHg.  Next, an 8 mm robotic Visiport trocar was inserted into the abdominal cavity of all 4 quadrants revealed no evidence of abdominal, bowel, or vascular injury present.   3 8 mm robotic trochars were placed, one in the left lateral mid abdomen and and 2 in the right mid abdomen region under direct localization without difficulty.   The gallbladder was visualized and this was grasped with a grasper and retracted over the dome of the liver.  ICG green was given preoperatively by nursing.  Firefly was used with immunofluorescent cholangiography revealing the cystic duct and the common bile duct anatomy.  Care was taken to avoid injury to the common bile duct.  Calot's triangle was dissected free using blunt and sharp dissection. The cystic duct and cystic arteries were dissected free at the base of the gallbladder.  A Hemoclip was placed around the cystic duct at the base of the gallbladder.  A small incision was made in the cystic duct and and a transcystic duct cholangiogram was performed. Cholangiogram revealed good flow into the intrahepatic ducts as well extrahepatic biliary system.       There was good flow into the duodenum.  There was no intraluminal filling defects present.    this was confirmed with the radiologist intraoperatively .  The cholangiocatheter was removed and 3 hemoclips were placed around the cystic duct with care to avoid tenting the cystic duct common duct junction. In a similar fashion, the cystic artery was then clipped with 2 hemoclips proximally and one distally and the base of the gallbladder.   The cystic duct and cystic artery were then divided with scissors.  The gallbladder was removed from the liver bed using electrocautery.  The gallbladder was then placed into the Endosac and brought out through the umbilicus without difficulty.  Pneumoperitoneum was reestablished approxi-15 mmHg.       Hemostasis of the liver bed was obtained using electrocautery.  The right upper quadrant was irrigated and aspirated with copious amounts of saline solution.  There was no active bleeding present.    .  The fascia of the 8 mm robotic trocar extraction site was closed using an Endo Close under direct visualization with 0 Vicryl suture.  The umbilical port was removed and the fascia was closed with no impingement of bowel or bleeding present.  The remaining pneumoperitoneum was removed, and the 8 mm robotic trocars were removed under direct visualization, jwith no impingement of bowel or bleeding was present. The wounds were irrigated thoroughly with saline solution.  The skin was approximated with 4-0 Vicryl running subcuticular suture.  Steri-Strips were applied to the wound.  Sterile dressings were applied to the wound.  The patient was transferred off the operative table to recovery room extubated in stable condition.  All counts were correct at the end of the case.  The role of my assistant was critical to the operation.  They acted in manipulation of the camera, retraction of the gallbladder as well as closure of the abdominal wounds.        JABIER GUZMAN JR., MD. [facility]  GENERAL SURGERY  [facility]    6/12/2025  8:21 PM  Adalid Anna MD

## 2025-06-13 NOTE — PLAN OF CARE
Pt is post op. Pt is alert and oriented on RA. Pt was afebrile over night. Pt declined any pain medication. IVF and abx are running as ordered. Wife at bedside. Call light is within reach and safety measures are in place.    Problem: PAIN - ADULT  Goal: Verbalizes/displays adequate comfort level or patient's stated pain goal  Description: INTERVENTIONS:  - Encourage pt to monitor pain and request assistance  - Assess pain using appropriate pain scale  - Administer analgesics based on type and severity of pain and evaluate response  - Implement non-pharmacological measures as appropriate and evaluate response  - Consider cultural and social influences on pain and pain management  - Manage/alleviate anxiety  - Utilize distraction and/or relaxation techniques  - Monitor for opioid side effects  - Notify MD/LIP if interventions unsuccessful or patient reports new pain  - Anticipate increased pain with activity and pre-medicate as appropriate  Outcome: Progressing     Problem: RISK FOR INFECTION - ADULT  Goal: Absence of fever/infection during anticipated neutropenic period  Description: INTERVENTIONS  - Monitor WBC  - Administer growth factors as ordered  - Implement neutropenic guidelines  Outcome: Progressing     Problem: GASTROINTESTINAL - ADULT  Goal: Minimal or absence of nausea and vomiting  Description: INTERVENTIONS:  - Maintain adequate hydration with IV or PO as ordered and tolerated  - Nasogastric tube to low intermittent suction as ordered  - Evaluate effectiveness of ordered antiemetic medications  - Provide nonpharmacologic comfort measures as appropriate  - Advance diet as tolerated, if ordered  - Obtain nutritional consult as needed  - Evaluate fluid balance  Outcome: Progressing  Goal: Maintains or returns to baseline bowel function  Description: INTERVENTIONS:  - Assess bowel function  - Maintain adequate hydration with IV or PO as ordered and tolerated  - Evaluate effectiveness of GI medications  -  Encourage mobilization and activity  - Obtain nutritional consult as needed  - Establish a toileting routine/schedule  - Consider collaborating with pharmacy to review patient's medication profile  Outcome: Progressing     Problem: METABOLIC/FLUID AND ELECTROLYTES - ADULT  Goal: Electrolytes maintained within normal limits  Description: INTERVENTIONS:  - Monitor labs and rhythm and assess patient for signs and symptoms of electrolyte imbalances  - Administer electrolyte replacement as ordered  - Monitor response to electrolyte replacements, including rhythm and repeat lab results as appropriate  - Fluid restriction as ordered  - Instruct patient on fluid and nutrition restrictions as appropriate  Outcome: Progressing     Problem: SKIN/TISSUE INTEGRITY - ADULT  Goal: Incision(s), wounds(s) or drain site(s) healing without S/S of infection  Description: INTERVENTIONS:  - Assess and document risk factors for pressure ulcer development  - Assess and document skin integrity  - Assess and document dressing/incision, wound bed, drain sites and surrounding tissue  - Implement wound care per orders  - Initiate isolation precautions as appropriate  - Initiate Pressure Ulcer prevention bundle as indicated  Outcome: Progressing

## 2025-06-13 NOTE — ANESTHESIA POSTPROCEDURE EVALUATION
Patient: Quincy De Souza    Procedure Summary       Date: 06/12/25 Room / Location: [facility] MAIN OR  / [facility] MAIN OR    Anesthesia Start: 1831 Anesthesia Stop: 2038    Procedure: XI ROBOT-ASSISTED LAPAROSCOPIC CHOLECYSTECTOMY WITH CHOLANGIOGRAM (Abdomen) Diagnosis: (Acute cholecystitis)    Surgeons: Ap Peña MD Anesthesiologist: Bairon Lan MD    Anesthesia Type: general ASA Status: 3            Anesthesia Type: general    Vitals Value Taken Time   /70 06/12/25 20:38   Temp 97.6 06/12/25 20:38   Pulse 85 06/12/25 20:37   Resp 14 06/12/25 20:38   SpO2 88 % 06/12/25 20:37   Vitals shown include unfiled device data.    [facility] AN Post Evaluation:   Patient Evaluated in PACU  Patient Participation: complete - patient participated  Level of Consciousness: awake  Pain Management: adequate  Airway Patency:patent  Yes    Nausea/Vomiting: none  Cardiovascular Status: acceptable  Respiratory Status: acceptable  Postoperative Hydration acceptable      Bairon Lan MD  6/12/2025 8:38 PM

## 2025-06-13 NOTE — PLAN OF CARE
A/O x 4. Febrile this morning, tmax 101, MD paged and tylenol given, blood cultures pending. Voiding WNL. Reports passing flatus. Abdominal lap sites clean/dry/intact. Ambulating frequently in hallway. IVF infusing, zosyn per orders. Tolerating full liquid diet, advanced to low fiber/low fat. Pain managed with morphine and tylenol. Bed in lowest position, call light in reach, frequent rounding, nonskid footwear, fall precautions in place.

## 2025-06-13 NOTE — PROGRESS NOTES
[facility]  part of formerly [facility]    Progress Note    Quincy De Souza Patient Status:  Observation    1965 MRN [id number]   Location [facility] 4W/SW/SE Attending Victor Manuel Mack MD   Hosp Day # 0 PCP Adalid Anna MD       Subjective:   Quincy De Souza is a(n) 59 year old   male feels better without complaints    Assessment and Plan:   Quincy De Souza is a(n) 59 year old male    POD #1 status post robotic cholecystectomy with cholangiogram  Patient doing well  Afebrile  Liver enzymes due to cautery of liver bed will repeat LFTs this afternoon if improved and tolerates diet Home this evening  VTE prophylaxis  Pulmonary toilet  Discussed intraoperative findings with the patient    Objective:   Blood pressure 152/61, pulse 70, temperature 97.4 °F (36.3 °C), temperature source Oral, resp. rate 18, height 5' 9\" (1.753 m), weight 270 lb (122.5 kg), SpO2 99%. I/O last 3 completed shifts:  In: 1827.1 [I.V.:1527.1; IV PIGGYBACK:300]  Out: 0      General appearance: alert, appears stated age, and cooperative  Neck: no JVD and supple, symmetrical, trachea midline  Pulmonary: No labored breathing, equal respiratory excursion  Cardiovascular: regular rate and rhythm  Abdominal: soft, non-tender; bowel sounds normal; no masses,  no organomegaly and dressing clean dry and intact  Extremities: extremities normal, atraumatic, no cyanosis or edema          Results:       Recent Labs   Lab 25  0925  0620   RBC 4.91 4.44   HGB 15.1 13.8   HCT 44.4 40.1   MCV 90.4 90.3   MCH 30.8 31.1   MCHC 34.0 34.4   RDW 12.5 12.6   NEPRELIM 5.87 8.03*   WBC 7.7 9.0   .0 169.0     No results found for: \"PT\", \"INR\"    Recent Labs   Lab 2527 25  0620   * 155*   BUN 12 9   CREATSERUM 1.30 1.17   CA 9.5 8.4*   ALB 4.8 3.9    137   K 4.3 4.5    106   CO2 28.0 23.0   ALKPHO 79 76   AST 31 83*   ALT 33 74*   BILT 0.6 0.6   TP 7.5 6.7             XR OPER CHOLANGIOGRAM  (CPT=74300)  Result Date: 6/12/2025  CONCLUSION:  1. Normal intraoperative cholangiogram    Dictated by (CST): Shoaib Mei MD on 6/12/2025 at 7:55 PM     Finalized by (CST): Shoaib Mei MD on 6/12/2025 at 7:56 PM          US GALLBLADDER (CPT=76705)  Result Date: 6/12/2025  CONCLUSION: Gallbladder contains multiple stones.  Positive sonographic Barahona's sign.  Trace pericholecystic fluid.  Findings are suspicious for acute cholecystitis.   Dictated by (CST): Maciej Escamilla MD on 6/12/2025 at 11:31 AM     Finalized by (CST): Maciej Escamilla MD on 6/12/2025 at 11:35 AM          EKG 12 Lead  Result Date: 6/12/2025  Normal sinus rhythm Normal ECG No previous ECGs found in [facility] Confirmed by SILVERIO DUDLEY, HOGAN ([id number]) on 6/12/2025 10:50:35 AM        Time spent in direct patient contact and decision making as well as counseling/coordination of care:    55044- 35 min      JABIER GUZMAN MD [facility]  GENERAL SURGERY  [facility]  6/13/2025  9:30 AM

## 2025-06-13 NOTE — BRIEF OP NOTE
Pre-Operative Diagnosis: Acute cholecystitis     Post-Operative Diagnosis: Acute cholecystitis      Procedure Performed:   XI ROBOT-ASSISTED LAPAROSCOPIC CHOLECYSTECTOMY WITH CHOLANGIOGRAM    Surgeons and Role:     * Ap Peña MD - Primary    Assistant(s):  Surgical Assistant.: Fransisco Juarez     Surgical Findings: Acute cholecystitis     Specimen: Gallbladder and cultures     Estimated Blood Loss: 15 m  Dictation Number:      Ap Peña MD  6/12/2025  8:20 PM

## 2025-06-13 NOTE — DISCHARGE INSTRUCTIONS
Dr. Peña  [phone]    DISCHARGE INSTRUCTIONS- ROBOTIC CHOLECYSTECTOMY      Activity can be resumed as tolerated including walking, stairs, light exercise and driving short distances.  Heavy lifting (i.e. objects > 15-20 lbs.) is to be avoided for 3-4 weeks.  Normal time off work is one to two weeks.    Incisional  pains are commonly of moderate intensity in the first 24-48 hours and gradually improve over the initial post-operative week.  Please take tylenol extra strength 2   500mg tabs every 8 hours around the clock.  Also take  Aleve 1-2 tablets every 12 hours around the clock.   Prescription pain medication (Tramadol) should be used initially according to the pain experienced and gradually weaned over the next few days.  Prescription pain medication can make you nauseated, light-headed and constipated: it should be taken with food and discontinued if side-effects develop.  A prescription for  stool softener  (Colace) is helpful to avoid constipation. Take 1 orally twice a day.   If  no bowel movement within 48 hours, MOM 30 mls may be helpful.    Your diet should consist primarily of liquids until you have a good appetite, usually the first day after surgery.  Fatty or fried foods should be avoided in the first week or two after surgery but subsequently a general diet may be resumed.    The dressing should not be removed until the first office visit.  You may shower in 24 hours, no bath or swimming for 4 weeks from your surgery date.  Apply an ice bag over your dressing for 72 hours.  No driving for 5 days or until off pain medication.   If the gauze dressing gets wet remove the Tegaderm and gauze but leave the Steri-Strips in place.    Activity after surgery  After surgery, take it easy for the rest of the day. If you had general anesthesia, don’t use machinery or power tools, drink alcohol, or make any major decisions for at least the first 24 hours.  Don’t drive while you are still taking opioid  pain medication, and don’t drive u.ntil you are able to step firmly on the brake pedal without hesitation.  Ask others to help with chores and errands while you recover.  Don’t lift anything heavier than 10 pounds until your doctor says it’s OK.  Don’t mow the lawn, shovel snow, use a vacuum , or do other strenuous activities until your doctor says it’s okay.  Walk as often as you feel able.  Continue the coughing and deep breathing exercises that you learned in the hospital.  Ask your doctor when you can expect to return to work.  Avoid constipation:  Eat fruits, vegetables, and whole grains   Drink 6-8 glasses of water a day, unless otherwise instructed.  Use a laxative or a mild stool softener as instructed by your doctor.  Call your doctor immediately if you have any of the following:  Yellowing of your eyes or skin (jaundice)  Chills  Fever above 101.5°F or 38.5°C    Redness, swelling, increasing pain, pus, or a foul smell at the incision site  Dark or rust-colored urine  Stool that is tam-colored or light in color instead of brown  Increasing abdominal pain  persistent nausea or bowel problems, uncontrolled pain or poor appetite      you should contact the office or the 24-hour answering service.      Contact the office tomorrow to make a follow appointment    with physician assistant LATIA Sotelo on Friday, June 27, 2025.  Call the office for appointment [phone]            JABIER GUZMAN JR., MD. [facility]  GENERAL SURGERY  [facility]  OFFICE [phone]    6/12/2025  8:13 PM

## 2025-06-13 NOTE — PROGRESS NOTES
[facility]  part of [place] Hospitalist Progress Note     Quincy De Souza Patient Status:  Observation    1965 MRN [id number]   Location [facility] 4W/SW/SE Attending Victor Manuel Mack MD   Hosp Day # 0 PCP Adalid Anna MD     Subjective:     Patient resting in bed, in good spirits, making jokes.   He developed a fever earlier today. He denied any chest pain, sob, n/v/d or any other complaints.       Objective:    Review of Systems:   ROS completed; pertinent positive and negatives stated in subjective.      Vital signs:  Temp:  [97.4 °F (36.3 °C)-103 °F (39.4 °C)] 100.1 °F (37.8 °C)  Pulse:  [] 84  Resp:  [12-20] 18  BP: (116-169)/(59-81) 132/75  SpO2:  [92 %-99 %] 93 %      Physical Exam:    Gen: NAD AO x3  Chest: good air entry CTABL  CVS: normal s1 and s2 RR  Abd: NABS soft NT ND  Neuro: CN 2-12 grossly intact  Ext: no edema in bilateral LE      Diagnostic Data:    Labs:  Recent Labs   Lab 25  0927 25  0620   WBC 7.7 9.0   HGB 15.1 13.8   MCV 90.4 90.3   .0 169.0       Recent Labs   Lab 25  0927 25  0620   * 155*   BUN 12 9   CREATSERUM 1.30 1.17   CA 9.5 8.4*   ALB 4.8 3.9    137   K 4.3 4.5    106   CO2 28.0 23.0   ALKPHO 79 76   AST 31 83*   ALT 33 74*   BILT 0.6 0.6   TP 7.5 6.7       Estimated Creatinine Clearance: 66.3 mL/min (based on SCr of 1.17 mg/dL).    No results for input(s): \"PTP\", \"INR\" in the last 168 hours.           Imaging: Imaging data reviewed in Epic.    Medications: Scheduled Medications[1]    Assessment & Plan:     Acute cholecystitis s/p lap laure  Tolerated surgery well  IVF, abx, pain meds and DVT ppx per surgery  Fever likely reactive to surgery  IS, chest PT  Monitor fever curve  Hold of abx at this time  CXR and Bcx pending  Advance care planning  Full code  ~31 minutes      Plan of care discussed with patient or family at bedside.      Supplementary Documentation:     Quality:  DVT  Prophylaxis: Heparin s/c  CODE status: Full      Estimated date of discharge: TBD  Discharge is dependent on: clinical stability  At this point Mr. De Souza is expected to be discharge to: home                   Victor Manuel Mack MD  Hospitalist    MDM: High, I personally reviewed the available laboratories, imaging. I discussed the case with RN. I ordered laboratories, studies including AM labs.  Medical decision making high, risk is high.       The 21st Century Cures Act makes medical notes like these available to patients in the interest of transparency. Please be advised this is a medical document. Medical documents are intended to carry relevant information, facts as evident, and the clinical opinion of the practitioner. The medical note is intended as peer to peer communication and may appear blunt or direct. It is written in medical language and may contain abbreviations or verbiage that are unfamiliar.          [1]    famotidine  20 mg Intravenous BID    piperacillin-tazobactam  4.5 g Intravenous Q8H    heparin  5,000 Units Subcutaneous Q12H

## 2025-06-14 VITALS
SYSTOLIC BLOOD PRESSURE: 146 MMHG | RESPIRATION RATE: 20 BRPM | WEIGHT: 270 LBS | HEART RATE: 64 BPM | TEMPERATURE: 98 F | OXYGEN SATURATION: 95 % | HEIGHT: 69 IN | DIASTOLIC BLOOD PRESSURE: 83 MMHG | BODY MASS INDEX: 39.99 KG/M2

## 2025-06-14 LAB
ALBUMIN SERPL-MCNC: 4 G/DL (ref 3.2–4.8)
ALBUMIN/GLOB SERPL: 1.6 {RATIO} (ref 1–2)
ALP LIVER SERPL-CCNC: 80 U/L (ref 45–117)
ALT SERPL-CCNC: 89 U/L (ref 10–49)
ANION GAP SERPL CALC-SCNC: 6 MMOL/L (ref 0–18)
ANION GAP SERPL CALC-SCNC: 6 MMOL/L (ref 0–18)
AST SERPL-CCNC: 62 U/L (ref ?–34)
BASOPHILS # BLD AUTO: 0.03 X10(3) UL (ref 0–0.2)
BASOPHILS NFR BLD AUTO: 0.5 %
BILIRUB DIRECT SERPL-MCNC: 0.2 MG/DL (ref ?–0.3)
BILIRUB SERPL-MCNC: 0.4 MG/DL (ref 0.2–1.1)
BUN BLD-MCNC: 9 MG/DL (ref 9–23)
BUN BLD-MCNC: 9 MG/DL (ref 9–23)
BUN/CREAT SERPL: 8.6 (ref 10–20)
BUN/CREAT SERPL: 8.6 (ref 10–20)
CALCIUM BLD-MCNC: 8.5 MG/DL (ref 8.7–10.4)
CALCIUM BLD-MCNC: 8.5 MG/DL (ref 8.7–10.4)
CHLORIDE SERPL-SCNC: 105 MMOL/L (ref 98–112)
CHLORIDE SERPL-SCNC: 105 MMOL/L (ref 98–112)
CO2 SERPL-SCNC: 27 MMOL/L (ref 21–32)
CO2 SERPL-SCNC: 27 MMOL/L (ref 21–32)
CREAT BLD-MCNC: 1.05 MG/DL (ref 0.7–1.3)
CREAT BLD-MCNC: 1.05 MG/DL (ref 0.7–1.3)
DEPRECATED RDW RBC AUTO: 43.4 FL (ref 35.1–46.3)
EGFRCR SERPLBLD CKD-EPI 2021: 81 ML/MIN/1.73M2 (ref 60–?)
EGFRCR SERPLBLD CKD-EPI 2021: 81 ML/MIN/1.73M2 (ref 60–?)
EOSINOPHIL # BLD AUTO: 0.16 X10(3) UL (ref 0–0.7)
EOSINOPHIL NFR BLD AUTO: 2.8 %
ERYTHROCYTE [DISTWIDTH] IN BLOOD BY AUTOMATED COUNT: 12.8 % (ref 11–15)
GLOBULIN PLAS-MCNC: 2.5 G/DL (ref 2–3.5)
GLUCOSE BLD-MCNC: 126 MG/DL (ref 70–99)
GLUCOSE BLD-MCNC: 126 MG/DL (ref 70–99)
HCT VFR BLD AUTO: 39.6 % (ref 39–53)
HGB BLD-MCNC: 13.4 G/DL (ref 13–17.5)
IMM GRANULOCYTES # BLD AUTO: 0.01 X10(3) UL (ref 0–1)
IMM GRANULOCYTES NFR BLD: 0.2 %
LYMPHOCYTES # BLD AUTO: 1.05 X10(3) UL (ref 1–4)
LYMPHOCYTES NFR BLD AUTO: 18.1 %
MAGNESIUM SERPL-MCNC: 2.1 MG/DL (ref 1.6–2.6)
MCH RBC QN AUTO: 31.2 PG (ref 26–34)
MCHC RBC AUTO-ENTMCNC: 33.8 G/DL (ref 31–37)
MCV RBC AUTO: 92.3 FL (ref 80–100)
MONOCYTES # BLD AUTO: 0.83 X10(3) UL (ref 0.1–1)
MONOCYTES NFR BLD AUTO: 14.3 %
NEUTROPHILS # BLD AUTO: 3.73 X10 (3) UL (ref 1.5–7.7)
NEUTROPHILS # BLD AUTO: 3.73 X10(3) UL (ref 1.5–7.7)
NEUTROPHILS NFR BLD AUTO: 64.1 %
OSMOLALITY SERPL CALC.SUM OF ELEC: 286 MOSM/KG (ref 275–295)
OSMOLALITY SERPL CALC.SUM OF ELEC: 286 MOSM/KG (ref 275–295)
PHOSPHATE SERPL-MCNC: 2.1 MG/DL (ref 2.4–5.1)
PLATELET # BLD AUTO: 161 10(3)UL (ref 150–450)
POTASSIUM SERPL-SCNC: 4.2 MMOL/L (ref 3.5–5.1)
POTASSIUM SERPL-SCNC: 4.2 MMOL/L (ref 3.5–5.1)
PROT SERPL-MCNC: 6.5 G/DL (ref 5.7–8.2)
RBC # BLD AUTO: 4.29 X10(6)UL (ref 4.3–5.7)
SODIUM SERPL-SCNC: 138 MMOL/L (ref 136–145)
SODIUM SERPL-SCNC: 138 MMOL/L (ref 136–145)
WBC # BLD AUTO: 5.8 X10(3) UL (ref 4–11)

## 2025-06-14 PROCEDURE — 99239 HOSP IP/OBS DSCHRG MGMT >30: CPT | Performed by: INTERNAL MEDICINE

## 2025-06-14 NOTE — DISCHARGE SUMMARY
[facility]  part of [facility]    DISCHARGE SUMMARY     Quincy De Souza Patient Status:  Observation    1965 MRN [id number]   Location [facility] 4W/SW/SE Attending Victor Manuel Mack MD   Hosp Day # 0 PCP Adalid Anna MD     Date of Admission: 2025  Date of Discharge:  6/15/2025    Discharge Disposition: Home or Self Care    Discharge Diagnosis:     Acute cholecystitis s/p lap alure  Fever likely reactive to surgery    History of Present Illness:     Patient is a 59-year-old  male, came in today to the emergency department for evaluation of intense right upper quadrant pain associated with nausea and vomiting since last night. CBC, chemistry, and liver function tests were unremarkable. Gallbladder ultrasound showed multiple stones, positive sonographic Barahona sign, pericholecystic fluid suspicious for acute cholecystitis. Started on IV antibiotics, and he will be admitted to the hospital for further management.     Brief Synopsis:     Acute cholecystitis s/p lap laure  Tolerated surgery well  IVF, abx, pain meds and DVT ppx per surgery  Fever likely reactive to surgery  IS, chest PT  Monitor fever curve  Hold of abx at this time  CXR reviewed  Bcx pending  Discharge planning    Patient is ambulating well, passing gas but no Bms.   He may be discharged if cleared by surgery. The patient is to follow up with PCP and Gsx as opt. Discharge meds including abx and pain meds per surgery.   Patient is to remain compliant with all discharge medications and instructions and to follow up as advised.   Patient encouraged to make healthy lifestyle and dietary changes.    Lace+ Score: 57  59-90 High Risk  29-58 Medium Risk  0-28   Low Risk       TCM Follow-Up Recommendation:  LACE 29-58: Moderate Risk of readmission after discharge from the hospital.      Procedures during hospitalization:   Lap laure    Incidental or significant findings and recommendations (brief  descriptions):  None     Lab/Test results pending at Discharge:   None    Consultants:  Consultants         Provider   Role Specialty     Ap Peña MD      Consulting Physician SURGERY, GENERAL     SiaLorenzo barros MD      Consulting Physician SURGERY, GENERAL            Discharge Medication List:     Discharge Medications        START taking these medications        Instructions Prescription details   docusate sodium 100 MG Caps  Commonly known as: Colace      Take 1 capsule (100 mg total) by mouth 2 (two) times daily for 7 days.   Stop taking on: June 19, 2025  Quantity: 14 capsule  Refills: 0     traMADol 50 MG Tabs  Commonly known as: Ultram      Take 1 tablet (50 mg total) by mouth every 6 (six) hours as needed.   Quantity: 15 tablet  Refills: 0            CONTINUE taking these medications        Instructions Prescription details   Centrum Tabs      Take 1 tablet by mouth in the morning.   Refills: 0     FISH OIL OR      Take 1 capsule by mouth daily.   Refills: 0     ibuprofen 200 MG Tabs  Commonly known as: Motrin      Take 2 tablets (400 mg total) by mouth every 6 (six) hours as needed for Pain.   Refills: 0     JUICE PLUS FIBRE OR      Take 1 tablet by mouth daily.   Refills: 0               Where to Get Your Medications        These medications were sent to [id number] - [place], [place] - [address] AT [facility], [phone], [phone]  [address], [facility] [address]      Hours: 24-hours Phone: [phone]   docusate sodium 100 MG Caps  traMADol 50 MG Tabs         [place]    Follow-up appointment:   Ricky Parra PA  [address]  [facility] [address] [phone]    Schedule an appointment as soon as possible for a visit on 6/27/2025  Call for Follow-up postoperatively    Adalid Anna MD  [address] [facility] [address] [phone]    Follow up in 1 week(s)      Appointments for Next  30 Days 6/14/2025 - 7/14/2025      None            Vital signs:  Temp:  [98.3 °F (36.8 °C)-98.6 °F (37 °C)] 98.3 °F (36.8 °C)  Pulse:  [69-73] 69  Resp:  [14-18] 14  BP: (112-133)/(73-87) 112/73  SpO2:  [92 %-94 %] 92 %    Physical Exam:    Gen: NAD AO x3  Chest: good air entry CTABL  CVS: normal s1 and s2 RR  Abd: NABS soft NT ND  Neuro: CN 2-12 grossly intact  Ext: no edema in bilateral LE    -----------------------------------------------------------------------------------------------  PATIENT DISCHARGE INSTRUCTIONS: See electronic chart    Victor Manuel Mack MD  Hospitalist    Time spent:  > 30 minutes    The 21st Century Cures Act makes medical notes like these available to patients in the interest of transparency. Please be advised this is a medical document. Medical documents are intended to carry relevant information, facts as evident, and the clinical opinion of the practitioner. The medical note is intended as peer to peer communication and may appear blunt or direct. It is written in medical language and may contain abbreviations or verbiage that are unfamiliar.

## 2025-06-14 NOTE — PLAN OF CARE
Pt feels well this morning. Up walking in the halls independently. Wife at bedside. Pt is passing gas. Tolerating diet. Wants to go home. Tylenol for pain PRN.       Problem: PAIN - ADULT  Goal: Verbalizes/displays adequate comfort level or patient's stated pain goal  Description: INTERVENTIONS:  - Encourage pt to monitor pain and request assistance  - Assess pain using appropriate pain scale  - Administer analgesics based on type and severity of pain and evaluate response  - Implement non-pharmacological measures as appropriate and evaluate response  - Consider cultural and social influences on pain and pain management  - Manage/alleviate anxiety  - Utilize distraction and/or relaxation techniques  - Monitor for opioid side effects  - Notify MD/LIP if interventions unsuccessful or patient reports new pain  - Anticipate increased pain with activity and pre-medicate as appropriate  Outcome: Progressing     Problem: RISK FOR INFECTION - ADULT  Goal: Absence of fever/infection during anticipated neutropenic period  Description: INTERVENTIONS  - Monitor WBC  - Administer growth factors as ordered  - Implement neutropenic guidelines  Outcome: Progressing     Problem: GASTROINTESTINAL - ADULT  Goal: Minimal or absence of nausea and vomiting  Description: INTERVENTIONS:  - Maintain adequate hydration with IV or PO as ordered and tolerated  - Nasogastric tube to low intermittent suction as ordered  - Evaluate effectiveness of ordered antiemetic medications  - Provide nonpharmacologic comfort measures as appropriate  - Advance diet as tolerated, if ordered  - Obtain nutritional consult as needed  - Evaluate fluid balance  Outcome: Progressing  Goal: Maintains or returns to baseline bowel function  Description: INTERVENTIONS:  - Assess bowel function  - Maintain adequate hydration with IV or PO as ordered and tolerated  - Evaluate effectiveness of GI medications  - Encourage mobilization and activity  - Obtain nutritional  consult as needed  - Establish a toileting routine/schedule  - Consider collaborating with pharmacy to review patient's medication profile  Outcome: Progressing     Problem: METABOLIC/FLUID AND ELECTROLYTES - ADULT  Goal: Electrolytes maintained within normal limits  Description: INTERVENTIONS:  - Monitor labs and rhythm and assess patient for signs and symptoms of electrolyte imbalances  - Administer electrolyte replacement as ordered  - Monitor response to electrolyte replacements, including rhythm and repeat lab results as appropriate  - Fluid restriction as ordered  - Instruct patient on fluid and nutrition restrictions as appropriate  Outcome: Progressing     Problem: SKIN/TISSUE INTEGRITY - ADULT  Goal: Incision(s), wounds(s) or drain site(s) healing without S/S of infection  Description: INTERVENTIONS:  - Assess and document risk factors for pressure ulcer development  - Assess and document skin integrity  - Assess and document dressing/incision, wound bed, drain sites and surrounding tissue  - Implement wound care per orders  - Initiate isolation precautions as appropriate  - Initiate Pressure Ulcer prevention bundle as indicated  Outcome: Progressing

## 2025-06-14 NOTE — PLAN OF CARE
Problem: Patient Centered Care  Goal: Patient preferences are identified and integrated in the patient's plan of care  Description: Interventions:  - What would you like us to know as we care for you? I  football  - Provide timely, complete, and accurate information to patient/family  - Incorporate patient and family knowledge, values, beliefs, and cultural backgrounds into the planning and delivery of care  - Encourage patient/family to participate in care and decision-making at the level they choose  - Honor patient and family perspectives and choices  Outcome: Adequate for Discharge     Problem: Patient/Family Goals  Goal: Patient/Family Long Term Goal  Description: Patient's Long Term Goal: discharge home    Interventions:  - See additional Care Plan goals for specific interventions  Outcome: Adequate for Discharge  Goal: Patient/Family Short Term Goal  Description: Patient's Short Term Goal: pain control    Interventions:   - See additional Care Plan goals for specific interventions  Outcome: Adequate for Discharge     Problem: PAIN - ADULT  Goal: Verbalizes/displays adequate comfort level or patient's stated pain goal  Description: INTERVENTIONS:  - Encourage pt to monitor pain and request assistance  - Assess pain using appropriate pain scale  - Administer analgesics based on type and severity of pain and evaluate response  - Implement non-pharmacological measures as appropriate and evaluate response  - Consider cultural and social influences on pain and pain management  - Manage/alleviate anxiety  - Utilize distraction and/or relaxation techniques  - Monitor for opioid side effects  - Notify MD/LIP if interventions unsuccessful or patient reports new pain  - Anticipate increased pain with activity and pre-medicate as appropriate  6/14/2025 1320 by Sadi Rosario, RN  Outcome: Adequate for Discharge  6/14/2025 1023 by Sadi Rosario, RN  Outcome: Progressing     Problem: RISK FOR INFECTION - ADULT  Goal:  Absence of fever/infection during anticipated neutropenic period  Description: INTERVENTIONS  - Monitor WBC  - Administer growth factors as ordered  - Implement neutropenic guidelines  6/14/2025 1320 by Sadi Rosario RN  Outcome: Adequate for Discharge  6/14/2025 1023 by Sadi Rosario RN  Outcome: Progressing     Problem: GASTROINTESTINAL - ADULT  Goal: Minimal or absence of nausea and vomiting  Description: INTERVENTIONS:  - Maintain adequate hydration with IV or PO as ordered and tolerated  - Nasogastric tube to low intermittent suction as ordered  - Evaluate effectiveness of ordered antiemetic medications  - Provide nonpharmacologic comfort measures as appropriate  - Advance diet as tolerated, if ordered  - Obtain nutritional consult as needed  - Evaluate fluid balance  6/14/2025 1320 by Sadi Rosario RN  Outcome: Adequate for Discharge  6/14/2025 1023 by Sadi Rosario RN  Outcome: Progressing  Goal: Maintains or returns to baseline bowel function  Description: INTERVENTIONS:  - Assess bowel function  - Maintain adequate hydration with IV or PO as ordered and tolerated  - Evaluate effectiveness of GI medications  - Encourage mobilization and activity  - Obtain nutritional consult as needed  - Establish a toileting routine/schedule  - Consider collaborating with pharmacy to review patient's medication profile  6/14/2025 1320 by Sadi Rosario RN  Outcome: Adequate for Discharge  6/14/2025 1023 by Sadi Rosario RN  Outcome: Progressing     Problem: METABOLIC/FLUID AND ELECTROLYTES - ADULT  Goal: Electrolytes maintained within normal limits  Description: INTERVENTIONS:  - Monitor labs and rhythm and assess patient for signs and symptoms of electrolyte imbalances  - Administer electrolyte replacement as ordered  - Monitor response to electrolyte replacements, including rhythm and repeat lab results as appropriate  - Fluid restriction as ordered  - Instruct patient on fluid and nutrition restrictions as  appropriate  6/14/2025 1320 by Sadi Rosario RN  Outcome: Adequate for Discharge  6/14/2025 1023 by Sadi Rosario RN  Outcome: Progressing     Problem: SKIN/TISSUE INTEGRITY - ADULT  Goal: Incision(s), wounds(s) or drain site(s) healing without S/S of infection  Description: INTERVENTIONS:  - Assess and document risk factors for pressure ulcer development  - Assess and document skin integrity  - Assess and document dressing/incision, wound bed, drain sites and surrounding tissue  - Implement wound care per orders  - Initiate isolation precautions as appropriate  - Initiate Pressure Ulcer prevention bundle as indicated  6/14/2025 1320 by Sadi Rosario RN  Outcome: Adequate for Discharge  6/14/2025 1023 by Sadi Rosario RN  Outcome: Progressing

## 2025-06-14 NOTE — PROGRESS NOTES
[facility]     Quincy De Souza Patient Status:  Observation    1965 MRN [id number]   Location [facility] 4W/SW/SE Attending Victor Manuel Mack MD   Hosp Day # 0 PCP Adalid Anna MD     Subjective:     Patient walking around the halls, in good spirits and in NAD.   Afebrile for the past 24 hours.   Looking forward to discharging soon.     Objective:    Review of Systems:   ROS completed; pertinent positive and negatives stated in subjective.      Vital signs:  Temp:  [98.3 °F (36.8 °C)-98.6 °F (37 °C)] 98.3 °F (36.8 °C)  Pulse:  [69-73] 69  Resp:  [14-18] 14  BP: (112-133)/(73-87) 112/73  SpO2:  [92 %-94 %] 92 %      Physical Exam:    Gen: NAD AO x3  Chest: good air entry CTABL  CVS: normal s1 and s2 RR  Abd: NABS soft NT ND  Neuro: CN 2-12 grossly intact  Ext: no edema in bilateral LE      Diagnostic Data:    Labs:  Recent Labs   Lab 25  0927 25  0620 25  1706 25  0757   WBC 7.7 9.0 6.0 5.8   HGB 15.1 13.8 14.1 13.4   MCV 90.4 90.3 88.6 92.3   .0 169.0 146.0* 161.0       Recent Labs   Lab 25  0925  0620 25  1706 25  0757   * 155*  --  126*  126*   BUN 12 9  --  9  9   CREATSERUM 1.30 1.17  --  1.05  1.05   CA 9.5 8.4*  --  8.5*  8.5*   ALB 4.8 3.9 4.2 4.0    137  --  138  138   K 4.3 4.5  --  4.2  4.2    106  --  105  105   CO2 28.0 23.0  --  27.0  27.0   ALKPHO 79 76 79 80   AST 31 83* 90* 62*   ALT 33 74* 91* 89*   BILT 0.6 0.6 0.6 0.4   TP 7.5 6.7 6.7 6.5       Estimated Creatinine Clearance: 73.9 mL/min (based on SCr of 1.05 mg/dL).    No results for input(s): \"PTP\", \"INR\" in the last 168 hours.           Imaging: Imaging data reviewed in Epic.    Medications: Scheduled Medications[1]    Assessment & Plan:     Acute cholecystitis s/p lap laure  Tolerated surgery well  IVF, abx, pain meds and DVT ppx per surgery  Fever likely reactive to  surgery  IS, chest PT  Monitor fever curve  Hold of abx at this time  CXR reviewed  Bcx pending  Discharge planning  Advance care planning  Full code  ~31 minutes      Plan of care discussed with patient or family at bedside.      Supplementary Documentation:     Quality:  DVT Prophylaxis: Heparin s/c  CODE status: Full      Estimated date of discharge: TBD  Discharge is dependent on: clinical stability  At this point Mr. De Souza is expected to be discharge to: home                   Victor Manuel Mack MD  Hospitalist    MDM: High, I personally reviewed the available laboratories, imaging. I discussed the case with RN. I ordered laboratories, studies including AM labs.  Medical decision making high, risk is high.       The 21st Century Cures Act makes medical notes like these available to patients in the interest of transparency. Please be advised this is a medical document. Medical documents are intended to carry relevant information, facts as evident, and the clinical opinion of the practitioner. The medical note is intended as peer to peer communication and may appear blunt or direct. It is written in medical language and may contain abbreviations or verbiage that are unfamiliar.          [1]    famotidine  20 mg Intravenous BID    piperacillin-tazobactam  4.5 g Intravenous Q8H    heparin  5,000 Units Subcutaneous Q12H

## 2025-06-14 NOTE — DIETARY NOTE
Brief Nutrition Note:     Wife requested diet education. Pt is post op lap laure with cholangiogram. On a low fiber/low fat diet. Advised being careful with fiber and fat for a short period time post op--dicussed basics. Urged long term wt loss post recovery period due to BMI close to 40 and hx of wt gain.     Rosalba Vargas RD, LDN   Clinical Dietitian [id number]

## 2025-06-14 NOTE — PROGRESS NOTES
[facility]  part of [facility]    Progress Note    Quincy De Souza Patient Status:  Observation    1965 MRN [id number]   Location [facility] 4W/SW/SE Attending Victor Manuel Mack MD   Hosp Day # 0 PCP Adalid Anna MD       Subjective:   Quincy De Souza is a(n) 59 year old   male feels better without complaints    Assessment and Plan:   Quincy De Souza is a(n) 59 year old male    POD #2 status post robotic cholecystectomy with cholangiogram  Patient doing well  Afebrile  Tolerating diet  Home this afternoon  Repeat LFTs as outpatient prior to office visit in 2 weeks  Discussed in detail with the patient as well as his wife.        Objective:   Blood pressure 146/83, pulse 64, temperature 98.1 °F (36.7 °C), temperature source Oral, resp. rate 20, height 5' 9\" (1.753 m), weight 270 lb (122.5 kg), SpO2 95%. I/O last 3 completed shifts:  In: 4130 [P.O.:1180; I.V.:2650; IV PIGGYBACK:300]  Out: 0      General appearance: alert, appears stated age, and cooperative  Neck: no JVD and supple, symmetrical, trachea midline  Pulmonary: No labored breathing, equal respiratory excursion  Cardiovascular: regular rate and rhythm  Abdominal: soft, non-tender; bowel sounds normal; no masses,  no organomegaly and dressing clean dry and intact  Extremities: extremities normal, atraumatic, no cyanosis or edema          Results:       Recent Labs   Lab 25  0625  0757   RBC 4.44 4.55 4.29*   HGB 13.8 14.1 13.4   HCT 40.1 40.3 39.6   MCV 90.3 88.6 92.3   MCH 31.1 31.0 31.2   MCHC 34.4 35.0 33.8   RDW 12.6 12.6 12.8   NEPRELIM 8.03* 4.92 3.73   WBC 9.0 6.0 5.8   .0 146.0* 161.0     No results found for: \"PT\", \"INR\"    Recent Labs   Lab 25  0927 25  0620 25  1706 25  0757   * 155*  --  126*  126*   BUN 12 9  --  9  9   CREATSERUM 1.30 1.17  --  1.05  1.05   CA 9.5 8.4*  --  8.5*  8.5*   ALB 4.8 3.9 4.2 4.0    137  --  138  138   K  4.3 4.5  --  4.2  4.2    106  --  105  105   CO2 28.0 23.0  --  27.0  27.0   ALKPHO 79 76 79 80   AST 31 83* 90* 62*   ALT 33 74* 91* 89*   BILT 0.6 0.6 0.6 0.4   TP 7.5 6.7 6.7 6.5             XR CHEST PA + LAT CHEST (XDZ=02385)  Result Date: 6/13/2025  CONCLUSION: Diminished lung volumes but no radiographic evidence of acute focal abnormality.    Dictated by (CST): Catrachito Barrett MD on 6/13/2025 at 12:53 PM     Finalized by (CST): Catrachito Barrett MD on 6/13/2025 at 12:53 PM          XR OPER CHOLANGIOGRAM (CPT=74300)  Result Date: 6/12/2025  CONCLUSION:  1. Normal intraoperative cholangiogram    Dictated by (CST): Shoaib Mei MD on 6/12/2025 at 7:55 PM     Finalized by (CST): Shoaib Mei MD on 6/12/2025 at 7:56 PM                    Time spent in direct patient contact and decision making as well as counseling/coordination of care:    70822- 35 min      JABIER GUZMAN JR., MD. [facility]    6/14/2025  1:25 PM

## 2025-06-25 ENCOUNTER — LAB ENCOUNTER (OUTPATIENT)
Dept: LAB | Facility: HOSPITAL | Age: 60
End: 2025-06-25
Attending: INTERNAL MEDICINE
Payer: COMMERCIAL

## 2025-06-25 DIAGNOSIS — Z00.00 ROUTINE GENERAL MEDICAL EXAMINATION AT HEALTH CARE FACILITY: Primary | ICD-10-CM

## 2025-06-25 DIAGNOSIS — K81.0 ACUTE CHOLECYSTITIS: ICD-10-CM

## 2025-06-25 LAB
ALBUMIN SERPL-MCNC: 4.6 G/DL (ref 3.2–4.8)
ALBUMIN/GLOB SERPL: 1.7 {RATIO} (ref 1–2)
ALP LIVER SERPL-CCNC: 89 U/L (ref 45–117)
ALT SERPL-CCNC: 29 U/L (ref 10–49)
ANION GAP SERPL CALC-SCNC: 7 MMOL/L (ref 0–18)
AST SERPL-CCNC: 17 U/L (ref ?–34)
BASOPHILS # BLD AUTO: 0.07 X10(3) UL (ref 0–0.2)
BASOPHILS NFR BLD AUTO: 0.8 %
BILIRUB SERPL-MCNC: 0.7 MG/DL (ref 0.2–1.1)
BUN BLD-MCNC: 13 MG/DL (ref 9–23)
BUN/CREAT SERPL: 12.5 (ref 10–20)
CALCIUM BLD-MCNC: 9.2 MG/DL (ref 8.7–10.4)
CHLORIDE SERPL-SCNC: 106 MMOL/L (ref 98–112)
CHOLEST SERPL-MCNC: 173 MG/DL (ref ?–200)
CO2 SERPL-SCNC: 28 MMOL/L (ref 21–32)
CREAT BLD-MCNC: 1.04 MG/DL (ref 0.7–1.3)
DEPRECATED RDW RBC AUTO: 40.7 FL (ref 35.1–46.3)
EGFRCR SERPLBLD CKD-EPI 2021: 82 ML/MIN/1.73M2 (ref 60–?)
EOSINOPHIL # BLD AUTO: 0.75 X10(3) UL (ref 0–0.7)
EOSINOPHIL NFR BLD AUTO: 8.5 %
ERYTHROCYTE [DISTWIDTH] IN BLOOD BY AUTOMATED COUNT: 12.3 % (ref 11–15)
EST. AVERAGE GLUCOSE BLD GHB EST-MCNC: 120 MG/DL (ref 68–126)
FASTING PATIENT LIPID ANSWER: YES
GLOBULIN PLAS-MCNC: 2.7 G/DL (ref 2–3.5)
GLUCOSE BLD-MCNC: 93 MG/DL (ref 70–99)
HBA1C MFR BLD: 5.8 % (ref ?–5.7)
HCT VFR BLD AUTO: 42.2 % (ref 39–53)
HDLC SERPL-MCNC: 47 MG/DL (ref 40–59)
HGB BLD-MCNC: 14.4 G/DL (ref 13–17.5)
IMM GRANULOCYTES # BLD AUTO: 0.04 X10(3) UL (ref 0–1)
IMM GRANULOCYTES NFR BLD: 0.5 %
LDLC SERPL CALC-MCNC: 97 MG/DL (ref ?–100)
LYMPHOCYTES # BLD AUTO: 2.06 X10(3) UL (ref 1–4)
LYMPHOCYTES NFR BLD AUTO: 23.3 %
MCH RBC QN AUTO: 30.3 PG (ref 26–34)
MCHC RBC AUTO-ENTMCNC: 34.1 G/DL (ref 31–37)
MCV RBC AUTO: 88.8 FL (ref 80–100)
MONOCYTES # BLD AUTO: 0.89 X10(3) UL (ref 0.1–1)
MONOCYTES NFR BLD AUTO: 10.1 %
NEUTROPHILS # BLD AUTO: 5.02 X10 (3) UL (ref 1.5–7.7)
NEUTROPHILS # BLD AUTO: 5.02 X10(3) UL (ref 1.5–7.7)
NEUTROPHILS NFR BLD AUTO: 56.8 %
NONHDLC SERPL-MCNC: 126 MG/DL (ref ?–130)
OSMOLALITY SERPL CALC.SUM OF ELEC: 292 MOSM/KG (ref 275–295)
PLATELET # BLD AUTO: 414 10(3)UL (ref 150–450)
POTASSIUM SERPL-SCNC: 4 MMOL/L (ref 3.5–5.1)
PROT SERPL-MCNC: 7.3 G/DL (ref 5.7–8.2)
RBC # BLD AUTO: 4.75 X10(6)UL (ref 4.3–5.7)
SODIUM SERPL-SCNC: 141 MMOL/L (ref 136–145)
TRIGL SERPL-MCNC: 165 MG/DL (ref 30–149)
VLDLC SERPL CALC-MCNC: 27 MG/DL (ref 0–30)
WBC # BLD AUTO: 8.8 X10(3) UL (ref 4–11)

## 2025-06-25 PROCEDURE — 80061 LIPID PANEL: CPT

## 2025-06-25 PROCEDURE — 85025 COMPLETE CBC W/AUTO DIFF WBC: CPT

## 2025-06-25 PROCEDURE — 83036 HEMOGLOBIN GLYCOSYLATED A1C: CPT

## 2025-06-25 PROCEDURE — 80053 COMPREHEN METABOLIC PANEL: CPT

## 2025-06-25 PROCEDURE — 36415 COLL VENOUS BLD VENIPUNCTURE: CPT

## (undated) DEVICE — ARM DRAPE

## (undated) DEVICE — BLAKE SILICONE DRAIN, 15 FR ROUND, HUBLESS: Brand: BLAKE

## (undated) DEVICE — ECHELON FLEX POWERED PLUS ARTICULATING ENDOSCOPIC LINEAR CUTTER , 60MM: Brand: ECHELON FLEX

## (undated) DEVICE — CONTAINER SPEC COLL AND TRNSPRT W/ WHT CAP

## (undated) DEVICE — GAMMEX® PI HYBRID SIZE 7.5, STERILE POWDER-FREE SURGICAL GLOVE, POLYISOPRENE AND NEOPRENE BLEND: Brand: GAMMEX

## (undated) DEVICE — SOLUTION ENDOSCOPIC ANTI-FOG NON-TOXIC NON-ABRASIVE 6 CUBIC CENTIMETER WITH RADIOPAQUE ADHESIVE-BACKED SPONGE STERILE NOT MADE WITH NATURAL RUBBER LATEX MEDICHOICE: Brand: MEDICHOICE

## (undated) DEVICE — ABDOMINAL PAD: Brand: CURITY

## (undated) DEVICE — SOL  .9 1000ML BTL

## (undated) DEVICE — UNDYED BRAIDED (POLYGLACTIN 910), SYNTHETIC ABSORBABLE SUTURE: Brand: COATED VICRYL

## (undated) DEVICE — SUTURE CHROMIC GUT 2-0 SH

## (undated) DEVICE — DEVICE PORT SITE CLOSURE

## (undated) DEVICE — CATH IV 12GA 3IN ANGIOCATH

## (undated) DEVICE — VISUALIZATION SYSTEM: Brand: CLEARIFY

## (undated) DEVICE — SOL  .9 3000ML

## (undated) DEVICE — DAVINCI XI CLIP HEM O LOK LARGE PURPLE

## (undated) DEVICE — 3M™ STERI-DRAPE™ INSTRUMENT POUCH 1018L: Brand: STERI-DRAPE™

## (undated) DEVICE — MINOR GENERAL: Brand: MEDLINE INDUSTRIES, INC.

## (undated) DEVICE — STANDARD HYPODERMIC NEEDLE,POLYPROPYLENE HUB: Brand: MONOJECT

## (undated) DEVICE — SUCTION CANISTER, 3000CC,SAFELINER: Brand: DEROYAL

## (undated) DEVICE — 3M™ IOBAN™ 2 ANTIMICROBIAL INCISE DRAPE 6650EZ: Brand: IOBAN™ 2

## (undated) DEVICE — ENDOPATH ECHELON ENDOSCOPIC LINEAR CUTTER RELOADS, BLUE, 60MM: Brand: ECHELON ENDOPATH

## (undated) DEVICE — ENCORE® LATEX ACCLAIM SIZE 7.5, STERILE LATEX POWDER-FREE SURGICAL GLOVE: Brand: ENCORE

## (undated) DEVICE — LARGE HEM-O-LOK CLIP APPLIER: Brand: ENDOWRIST

## (undated) DEVICE — 3M™ IOBAN™ 2 ANTIMICROBIAL INCISE DRAPE 6651EZ: Brand: IOBAN™ 2

## (undated) DEVICE — Device

## (undated) DEVICE — SOLUTION IRRIG 1000ML 0.9% NACL USP BTL

## (undated) DEVICE — KIT COLLECTION COPAN ESWAB 1ML

## (undated) DEVICE — ENDOPATH ETS-FLEX45 ARTICULATING ENDOSCOPIC LINEAR CUTTER, NO RELOAD: Brand: ENDOPATH

## (undated) DEVICE — INSUFFLATION NEEDLE TO ESTABLISH PNEUMOPERITONEUM.: Brand: INSUFFLATION NEEDLE

## (undated) DEVICE — DISPOSABLE SUCTION/IRRIGATOR TUBE SET: Brand: AHTO

## (undated) DEVICE — TROCAR: Brand: KII FIOS FIRST ENTRY

## (undated) DEVICE — SOLUTION IRRIG 3000ML 0.9% NACL FLX CONT

## (undated) DEVICE — DRAPE,UNDRBUT,WHT GRAD PCH,CAPPORT,20/CS: Brand: MEDLINE

## (undated) DEVICE — DRAPE SHEET LAPAROTOMY

## (undated) DEVICE — PERMANENT CAUTERY HOOK: Brand: ENDOWRIST

## (undated) DEVICE — TISSUE RETRIEVAL SYSTEM: Brand: INZII RETRIEVAL SYSTEM

## (undated) DEVICE — OPEN-END URETERAL CATHETER SOF-FLEX: Brand: SOF-FLEX

## (undated) DEVICE — GAUZE SPONGES,12 PLY: Brand: CURITY

## (undated) DEVICE — SOLUTION IV ACD-ASOD CITR DEX

## (undated) DEVICE — ENDOPATH ETS45 2.5MM RELOADS (VASCULAR/THIN): Brand: ENDOPATH

## (undated) DEVICE — SOL H2O 1000ML BTL

## (undated) DEVICE — CLIPPER BLADE 3M

## (undated) DEVICE — COLUMN DRAPE

## (undated) DEVICE — DRAIN RESERVOIR RELIAVAC 100CC

## (undated) DEVICE — GOWN,SIRUS,FABRNF,RAGLAN,XL,ST,28/CS: Brand: MEDLINE

## (undated) DEVICE — SUT COAT VCRL 2-0 27IN ABSRB UD 26MM CT-2

## (undated) DEVICE — LAP CHOLE: Brand: MEDLINE INDUSTRIES, INC.

## (undated) DEVICE — MATERNITY KNIT PANTS,SEAMLESS: Brand: WINGS

## (undated) DEVICE — GLOVE SUR 7.5 SENSICARE PI PIP CRM PWD F

## (undated) DEVICE — COVER SGL STRL LGHT HNDL BLU

## (undated) DEVICE — DRAPE SHEET LAPCHOLE 124X100X7

## (undated) DEVICE — ZZ-DISC-SUB-525193- PAD POS 36IN DISP SURGYPAD

## (undated) DEVICE — ADHESIVE MASTISOL 2/3CC VL

## (undated) DEVICE — ADHESIVE LIQ 2/3ML VI MASTISOL

## (undated) DEVICE — TROCAR: Brand: KII® SLEEVE

## (undated) DEVICE — DRAPE SHEET LG

## (undated) DEVICE — CONTAINER SPEC STR 4OZ GRY LID

## (undated) DEVICE — SUTURE VLOC 180 2-0 9" 2145

## (undated) DEVICE — BLADELESS OBTURATOR: Brand: WECK VISTA

## (undated) DEVICE — SUCTION IRRIGATOR: Brand: ENDOWRIST

## (undated) DEVICE — SUTURE VICRYL 0 J906G

## (undated) DEVICE — BLAKE SILICONE DRAIN, 15 FR ROUND, HUBLESS WITH 3/16" TROCAR: Brand: BLAKE

## (undated) DEVICE — [HIGH FLOW INSUFFLATOR,  DO NOT USE IF PACKAGE IS DAMAGED,  KEEP DRY,  KEEP AWAY FROM SUNLIGHT,  PROTECT FROM HEAT AND RADIOACTIVE SOURCES.]: Brand: PNEUMOSURE

## (undated) DEVICE — NON-ADHERENT PAD PREPACK: Brand: TELFA

## (undated) DEVICE — SUTURE SILK 2-0 FS

## (undated) DEVICE — PAD ALC 43.5X24IN PREP  LF

## (undated) DEVICE — SPONGE LAP 18X18IN 7IN LOOP

## (undated) DEVICE — GOWN,SIRUS,FABRNF,RAGLAN,L,ST,30/CS: Brand: MEDLINE

## (undated) DEVICE — MEGADYNE ELECTRODE ADULT PT RT

## (undated) DEVICE — SUTURE CHROMIC 2-0 893H

## (undated) DEVICE — Device: Brand: JELCO

## (undated) DEVICE — ENCORE® LATEX MICRO SIZE 7.5, STERILE LATEX POWDER-FREE SURGICAL GLOVE: Brand: ENCORE

## (undated) DEVICE — PLUMEPORT ACTIV LAPAROSCOPIC SMOKE FILTRATION DEVICE: Brand: PLUMEPORT ACTIVE

## (undated) DEVICE — ANTIBACTERIAL UNDYED BRAIDED (POLYGLACTIN 910), SYNTHETIC ABSORBABLE SUTURE: Brand: COATED VICRYL

## (undated) DEVICE — COVER,MAYO STAND,STERILE: Brand: MEDLINE

## (undated) DEVICE — TRAY SKIN PREP PVP-1

## (undated) DEVICE — SEAL

## (undated) NOTE — LETTER
Hospital Discharge Documentation      From: [facility] Hospitalist's Office  Phone: [phone]    Patient discharged time/date: 2025  2:57 PM  Patient discharge disposition:  Home or Self Care       Discharge Summary - D/C Summary        Discharge Summary signed by Victor Manuel Mack MD at 2025 11:10 AM  Version 1 of 1      Author: Victor Manuel Mack MD Service: Hospitalist Author Type: Physician    Filed: 2025 11:10 AM Date of Service: 2025 11:09 AM Status: Signed    : Victor Manuel Mack MD (Physician)           [facility]  part of [facility]    DISCHARGE SUMMARY     Quincy De Souza Patient Status:  Observation    1965 MRN [id number]   Location [facility] 4W/SW/SE Attending Victor Manuel Mack MD   Hosp Day # 0 PCP Adalid Anna MD     Date of Admission: 2025  Date of Discharge:  6/15/2025    Discharge Disposition: Home or Self Care    Discharge Diagnosis:     Acute cholecystitis s/p lap laure  Fever likely reactive to surgery    History of Present Illness:     Patient is a 59-year-old  male, came in today to the emergency department for evaluation of intense right upper quadrant pain associated with nausea and vomiting since last night. CBC, chemistry, and liver function tests were unremarkable. Gallbladder ultrasound showed multiple stones, positive sonographic Barahona sign, pericholecystic fluid suspicious for acute cholecystitis. Started on IV antibiotics, and he will be admitted to the hospital for further management.     Brief Synopsis:     Acute cholecystitis s/p lap laure  Tolerated surgery well  IVF, abx, pain meds and DVT ppx per surgery  Fever likely reactive to surgery  IS, chest PT  Monitor fever curve  Hold of abx at this time  CXR reviewed  Bcx pending  Discharge planning    Patient is ambulating well, passing gas but no Bms.   He may be discharged if cleared by surgery. The patient is to follow up with PCP and G[facility] as opt. Discharge meds  including abx and pain meds per surgery.   Patient is to remain compliant with all discharge medications and instructions and to follow up as advised.   Patient encouraged to make healthy lifestyle and dietary changes.    Lace+ Score: 57  59-90 High Risk  29-58 Medium Risk  0-28   Low Risk       TCM Follow-Up Recommendation:  LACE 29-58: Moderate Risk of readmission after discharge from the hospital.      Procedures during hospitalization:   Lap laure    Incidental or significant findings and recommendations (brief descriptions):  None     Lab/Test results pending at Discharge:   None    Consultants:  Consultants         Provider   Role Specialty     Ap Peña MD      Consulting Physician SURGERY, GENERAL     Lorenzo Bell MD      Consulting Physician SURGERY, GENERAL            Discharge Medication List:     Discharge Medications        START taking these medications        Instructions Prescription details   docusate sodium 100 MG Caps  Commonly known as: Colace      Take 1 capsule (100 mg total) by mouth 2 (two) times daily for 7 days.   Stop taking on: June 19, 2025  Quantity: 14 capsule  Refills: 0     traMADol 50 MG Tabs  Commonly known as: Ultram      Take 1 tablet (50 mg total) by mouth every 6 (six) hours as needed.   Quantity: 15 tablet  Refills: 0            CONTINUE taking these medications        Instructions Prescription details   Centrum Tabs      Take 1 tablet by mouth in the morning.   Refills: 0     FISH OIL OR      Take 1 capsule by mouth daily.   Refills: 0     ibuprofen 200 MG Tabs  Commonly known as: Motrin      Take 2 tablets (400 mg total) by mouth every 6 (six) hours as needed for Pain.   Refills: 0     JUICE PLUS FIBRE OR      Take 1 tablet by mouth daily.   Refills: 0               Where to Get Your Medications        These medications were sent to [id number] - [place], [place] - [address] AT [facility], [phone], [phone]   [address], [place] [facility] [address]      Hours: 24-hours Phone: [phone]   docusate sodium 100 MG Caps  traMADol 50 MG Tabs         [place]    Follow-up appointment:   Ricky Parra PA  [address]  [address]  [facility] [address] [phone]    Schedule an appointment as soon as possible for a visit on 6/27/2025  Call for Follow-up postoperatively    Adalid Anna MD  [address] [facility] [address] [phone]    Follow up in 1 week(s)      Appointments for Next 30 Days 6/14/2025 - 7/14/2025      None            Vital signs:  Temp:  [98.3 °F (36.8 °C)-98.6 °F (37 °C)] 98.3 °F (36.8 °C)  Pulse:  [69-73] 69  Resp:  [14-18] 14  BP: (112-133)/(73-87) 112/73  SpO2:  [92 %-94 %] 92 %    Physical Exam:    Gen: NAD AO x3  Chest: good air entry CTABL  CVS: normal s1 and s2 RR  Abd: NABS soft NT ND  Neuro: CN 2-12 grossly intact  Ext: no edema in bilateral LE    -----------------------------------------------------------------------------------------------  PATIENT DISCHARGE INSTRUCTIONS: See electronic chart    Victor Manuel Mack MD  Hospitalist    Time spent:  > 30 minutes    The 21st Century Cures Act makes medical notes like these available to patients in the interest of transparency. Please be advised this is a medical document. Medical documents are intended to carry relevant information, facts as evident, and the clinical opinion of the practitioner. The medical note is intended as peer to peer communication and may appear blunt or direct. It is written in medical language and may contain abbreviations or verbiage that are unfamiliar.     Electronically signed by Victor Manuel Mack MD on 6/14/2025 11:10 AM

## (undated) NOTE — LETTER
[facility]  Administration of Anesthesia  1. I, Cade Garcia, or _________________________________ acting on his behalf, (Patient) (Dependent/Representative) request to receive anesthesia for my pending procedure/operation/treatment.   A phys bleeding, seizure, cardiac arrest and death. 7. AWARENESS: I understand that it is possible (but unlikely) to have explicit memory of events from the operating room while under general anesthesia.   8. ELECTROCONVULSIVE THERAPY PATIENTS: This consent serve below affirms that prior to the time of the procedure, I have explained to the patient and/or his/her guardian, the risks and benefits of undergoing anesthesia, as well as any reasonable alternatives.     ___________________________________________________

## (undated) NOTE — ED AVS SNAPSHOT
[facility] Emergency Department    [address]     [place] [address]    Phone:  [phone]    Fax:  [phone]           Patrick Rehman   MRN: [id number]    Department:  [facility] Emergency Department   Date of Visit:  5/27/2017 and Class Registration line at [phone] or find a doctor online by visiting [email or web address].    IF THERE IS ANY CHANGE OR WORSENING OF YOUR CONDITION, CALL YOUR PRIMARY CARE PHYSICIAN AT ONCE OR RETURN IMMEDIATELY TO [address] [place] [address].     If

## (undated) NOTE — LETTER
[address]  [address] [place], [place]      Authorization for Surgical Operation and Procedure     Date:___________                                                                                                         Time:_______ potential risks that can occur: fever and allergic reactions, hemolytic reactions, transmission of diseases such as Hepatitis, AIDS and Cytomegalovirus (CMV) and fluid overload.   In the event that I wish to have an autologous transfusion of my own blood, o will determine when the applicable recovery period ends for purposes of reinstating the DNAR order.   10. Patients having a sterilization procedure: I understand that if the procedure is successful the results will be permanent and it will therefore be impo _______________________________________________________________ _____________________________  [facility])                                                                                         (Date)                                   [employer]

## (undated) NOTE — ED AVS SNAPSHOT
[facility] Emergency Department    [address]     [place] [address]    Phone:  [phone]    Fax:  [phone]           Mc Karen   MRN: [id number]    Department:  [facility] Emergency Department   Date of Visit:  5/27/2017 Bring a paper prescription for each of these medications    - Dicyclomine HCl 20 MG Tabs            Discharge References/Attachments     ABDOMINAL PAIN, UNKNOWN CAUSE, (MALE) (ENGLISH)      Disclosure     Insurance plans vary and the physician(s) referred IF THERE IS ANY CHANGE OR WORSENING OF YOUR CONDITION, CALL YOUR PRIMARY CARE PHYSICIAN AT ONCE OR RETURN IMMEDIATELY TO THE EMERGENCY DEPARTMENT.     If you have been prescribed any medication(s), please fill your prescription right away and begin taking t - If you have concerns related to behavioral health issues or thoughts of harming yourself, contact [address] [place] [address] at [phone].     - If you don’t have insurance, Stefania Newton has partnered with Patient [employer]